# Patient Record
Sex: FEMALE | Race: WHITE | NOT HISPANIC OR LATINO | Employment: OTHER | URBAN - METROPOLITAN AREA
[De-identification: names, ages, dates, MRNs, and addresses within clinical notes are randomized per-mention and may not be internally consistent; named-entity substitution may affect disease eponyms.]

---

## 2023-03-14 LAB
ALANINE AMINOTRANSFERASE (SGPT) (U/L) IN SER/PLAS: 17 U/L (ref 7–45)
ALBUMIN (G/DL) IN SER/PLAS: 4 G/DL (ref 3.4–5)
ALKALINE PHOSPHATASE (U/L) IN SER/PLAS: 86 U/L (ref 33–110)
ANION GAP IN SER/PLAS: 9 MMOL/L (ref 10–20)
ASPARTATE AMINOTRANSFERASE (SGOT) (U/L) IN SER/PLAS: 15 U/L (ref 9–39)
BILIRUBIN TOTAL (MG/DL) IN SER/PLAS: 0.3 MG/DL (ref 0–1.2)
C REACTIVE PROTEIN (MG/L) IN SER/PLAS: 0.61 MG/DL
CALCIUM (MG/DL) IN SER/PLAS: 9.1 MG/DL (ref 8.6–10.3)
CARBON DIOXIDE, TOTAL (MMOL/L) IN SER/PLAS: 35 MMOL/L (ref 21–32)
CERULOPLASMIN (MG/DL) IN SER/PLAS: 28 MG/DL (ref 20–60)
CHLORIDE (MMOL/L) IN SER/PLAS: 100 MMOL/L (ref 98–107)
CREATININE (MG/DL) IN SER/PLAS: 0.74 MG/DL (ref 0.5–1.05)
DEAMIDATED GLIADIN PEPTIDE IGA: <1 U/ML (ref 0–14)
DEAMIDATED GLIADIN PEPTIDE IGG: <1 U/ML (ref 0–14)
FERRITIN (UG/LL) IN SER/PLAS: 20 UG/L (ref 8–150)
GFR FEMALE: >90 ML/MIN/1.73M2
GLUCOSE (MG/DL) IN SER/PLAS: 83 MG/DL (ref 74–99)
HEPATITIS A TOTAL AB INTERPRETATION: NONREACTIVE
HEPATITIS B VIRUS CORE IGM AB PRESENCE IN SER/PLAS BY IMMUNOASSY: NONREACTIVE
HEPATITIS B VIRUS SURFACE AB (MIU/ML) IN SERUM: <3.1 MIU/ML
HEPATITIS C VIRUS AB PRESENCE IN SERUM: NONREACTIVE
IRON (UG/DL) IN SER/PLAS: 57 UG/DL (ref 35–150)
IRON BINDING CAPACITY (UG/DL) IN SER/PLAS: 427 UG/DL (ref 240–445)
IRON SATURATION (%) IN SER/PLAS: 13 % (ref 25–45)
POTASSIUM (MMOL/L) IN SER/PLAS: 3.2 MMOL/L (ref 3.5–5.3)
PROTEIN TOTAL: 6.5 G/DL (ref 6.4–8.2)
SODIUM (MMOL/L) IN SER/PLAS: 141 MMOL/L (ref 136–145)
THYROTROPIN (MIU/L) IN SER/PLAS BY DETECTION LIMIT <= 0.05 MIU/L: 1.54 MIU/L (ref 0.44–3.98)
TISSUE TRANSGLUTAMINASE IGG: <1 U/ML (ref 0–14)
TISSUE TRANSGLUTAMINASE, IGA: <1 U/ML (ref 0–14)
UREA NITROGEN (MG/DL) IN SER/PLAS: 9 MG/DL (ref 6–23)

## 2023-03-15 LAB
ANA PATTERN: ABNORMAL
ANA TITER: ABNORMAL
ANTI-CENTROMERE: <0.2 AI
ANTI-CHROMATIN: <0.2 AI
ANTI-DNA (DS): 1 IU/ML
ANTI-JO-1 IGG: <0.2 AI
ANTI-NUCLEAR ANTIBODY (ANA): POSITIVE
ANTI-RIBOSOMAL P: <0.2 AI
ANTI-RNP: <0.2 AI
ANTI-SCL-70: <0.2 AI
ANTI-SM/RNP: <0.2 AI
ANTI-SM: <0.2 AI
ANTI-SMOOTH MUSCLE ANTIBODY: ABNORMAL
ANTI-SSA: <0.2 AI
ANTI-SSB: <0.2 AI
MITOCHONDRIAL ANTIBODY: NEGATIVE

## 2023-03-18 LAB
ALPHA-1 ANTITRYPSIN PHENOTYPE: NORMAL
ALPHA-1-ANTITRYPSIN (REF): 105 MG/DL (ref 90–200)

## 2023-03-20 LAB
MISCELLANEUOUS TEST RESULT: NORMAL
NAME OF SENDOUT TEST: NORMAL

## 2023-03-22 ENCOUNTER — TELEPHONE (OUTPATIENT)
Dept: PRIMARY CARE | Facility: CLINIC | Age: 58
End: 2023-03-22
Payer: COMMERCIAL

## 2023-03-29 ENCOUNTER — HOSPITAL ENCOUNTER (OUTPATIENT)
Dept: DATA CONVERSION | Facility: HOSPITAL | Age: 58
End: 2023-03-29
Attending: INTERNAL MEDICINE | Admitting: INTERNAL MEDICINE
Payer: COMMERCIAL

## 2023-03-29 DIAGNOSIS — K63.89 OTHER SPECIFIED DISEASES OF INTESTINE: ICD-10-CM

## 2023-03-29 DIAGNOSIS — I47.10 SUPRAVENTRICULAR TACHYCARDIA (CMS-HCC): ICD-10-CM

## 2023-03-29 DIAGNOSIS — Z72.0 TOBACCO USE: ICD-10-CM

## 2023-03-29 DIAGNOSIS — K57.30 DIVERTICULOSIS OF LARGE INTESTINE WITHOUT PERFORATION OR ABSCESS WITHOUT BLEEDING: ICD-10-CM

## 2023-03-29 DIAGNOSIS — F20.9 SCHIZOPHRENIA, UNSPECIFIED (MULTI): ICD-10-CM

## 2023-03-29 DIAGNOSIS — R19.7 DIARRHEA, UNSPECIFIED: ICD-10-CM

## 2023-03-29 DIAGNOSIS — G40.909 EPILEPSY, UNSPECIFIED, NOT INTRACTABLE, WITHOUT STATUS EPILEPTICUS (MULTI): ICD-10-CM

## 2023-03-29 DIAGNOSIS — F41.9 ANXIETY DISORDER, UNSPECIFIED: ICD-10-CM

## 2023-03-29 DIAGNOSIS — F31.9 BIPOLAR DISORDER, UNSPECIFIED (MULTI): ICD-10-CM

## 2023-03-29 DIAGNOSIS — E03.9 HYPOTHYROIDISM, UNSPECIFIED: ICD-10-CM

## 2023-03-29 DIAGNOSIS — D50.0 IRON DEFICIENCY ANEMIA SECONDARY TO BLOOD LOSS (CHRONIC): ICD-10-CM

## 2023-03-29 DIAGNOSIS — K21.9 GASTRO-ESOPHAGEAL REFLUX DISEASE WITHOUT ESOPHAGITIS: ICD-10-CM

## 2023-03-29 DIAGNOSIS — K63.5 POLYP OF COLON: ICD-10-CM

## 2023-03-29 DIAGNOSIS — K52.9 NONINFECTIVE GASTROENTERITIS AND COLITIS, UNSPECIFIED: ICD-10-CM

## 2023-03-29 DIAGNOSIS — F44.81 DISSOCIATIVE IDENTITY DISORDER (MULTI): ICD-10-CM

## 2023-03-29 DIAGNOSIS — K64.0 FIRST DEGREE HEMORRHOIDS: ICD-10-CM

## 2023-03-29 DIAGNOSIS — J43.9 EMPHYSEMA, UNSPECIFIED (MULTI): ICD-10-CM

## 2023-03-29 DIAGNOSIS — E66.9 OBESITY, UNSPECIFIED: ICD-10-CM

## 2023-03-30 LAB — C. DIFFICILE TOXIN, PCR: NOT DETECTED

## 2023-04-05 LAB
COMPLETE PATHOLOGY REPORT: NORMAL
CONVERTED CLINICAL DIAGNOSIS-HISTORY: NORMAL
CONVERTED FINAL DIAGNOSIS: NORMAL
CONVERTED FINAL REPORT PDF LINK TO COPY AND PASTE: NORMAL
CONVERTED GROSS DESCRIPTION: NORMAL
CONVERTED MICROSCOPIC DESCRIPTION: NORMAL

## 2023-04-10 LAB
CAMPYLOBACTER GP: NOT DETECTED
NOROVIRUS GI/GII: NOT DETECTED
ROTAVIRUS A: NOT DETECTED
SALMONELLA SP.: NOT DETECTED
SHIGA TOXIN 1: NOT DETECTED
SHIGA TOXIN 2: NOT DETECTED
SHIGELLA SP.: NOT DETECTED
VIBRIO GRP.: NOT DETECTED
YERSINIA ENTEROCOLITICA: NOT DETECTED

## 2023-04-19 PROBLEM — R43.9 TASTE DISORDER: Status: ACTIVE | Noted: 2023-04-19

## 2023-04-19 PROBLEM — M17.0 OSTEOARTHRITIS OF PATELLOFEMORAL JOINTS OF BOTH KNEES: Status: ACTIVE | Noted: 2023-04-19

## 2023-04-19 PROBLEM — R60.0 PERIPHERAL EDEMA: Status: ACTIVE | Noted: 2023-04-19

## 2023-04-19 PROBLEM — M22.2X2 PATELLOFEMORAL PAIN SYNDROME OF BOTH KNEES: Status: ACTIVE | Noted: 2023-04-19

## 2023-04-19 PROBLEM — R42 DIZZINESS: Status: ACTIVE | Noted: 2023-04-19

## 2023-04-19 PROBLEM — R30.0 DYSURIA: Status: ACTIVE | Noted: 2023-04-19

## 2023-04-19 PROBLEM — M22.2X1 PATELLOFEMORAL PAIN SYNDROME OF BOTH KNEES: Status: ACTIVE | Noted: 2023-04-19

## 2023-04-19 PROBLEM — R10.9 BILATERAL FLANK PAIN: Status: ACTIVE | Noted: 2023-04-19

## 2023-04-19 PROBLEM — R60.0 BILATERAL EDEMA OF LOWER EXTREMITY: Status: ACTIVE | Noted: 2023-04-19

## 2023-04-19 PROBLEM — R07.9 CHEST PAIN: Status: ACTIVE | Noted: 2023-04-19

## 2023-04-19 PROBLEM — K76.0 FATTY LIVER: Status: ACTIVE | Noted: 2023-04-19

## 2023-04-19 PROBLEM — R41.3 POOR MEMORY: Status: ACTIVE | Noted: 2023-04-19

## 2023-04-19 PROBLEM — J44.1 COPD EXACERBATION (MULTI): Status: ACTIVE | Noted: 2023-04-19

## 2023-04-19 PROBLEM — J34.89 MASS OF NASAL SINUS: Status: ACTIVE | Noted: 2023-04-19

## 2023-04-19 PROBLEM — L03.116 CELLULITIS OF FOOT, LEFT: Status: ACTIVE | Noted: 2023-04-19

## 2023-04-19 PROBLEM — H81.01 MENIERE'S DISEASE OF RIGHT EAR: Status: ACTIVE | Noted: 2023-04-19

## 2023-04-19 PROBLEM — I73.9 PERIPHERAL ARTERIAL DISEASE (CMS-HCC): Status: ACTIVE | Noted: 2023-04-19

## 2023-04-19 PROBLEM — R06.09 EXERTIONAL DYSPNEA: Status: ACTIVE | Noted: 2023-04-19

## 2023-04-19 PROBLEM — J34.89 NASAL CONGESTION WITH RHINORRHEA: Status: ACTIVE | Noted: 2023-04-19

## 2023-04-19 PROBLEM — D64.9 ANEMIA: Status: ACTIVE | Noted: 2023-04-19

## 2023-04-19 PROBLEM — E03.9 HYPOTHYROID: Status: ACTIVE | Noted: 2023-04-19

## 2023-04-19 PROBLEM — L74.9 SWEATING ABNORMALITY: Status: ACTIVE | Noted: 2023-04-19

## 2023-04-19 PROBLEM — L02.612 FOOT ABSCESS, LEFT: Status: ACTIVE | Noted: 2023-04-19

## 2023-04-19 PROBLEM — J34.89 NASAL DRYNESS: Status: ACTIVE | Noted: 2023-04-19

## 2023-04-19 PROBLEM — J34.2 DEVIATED NASAL SEPTUM: Status: ACTIVE | Noted: 2023-04-19

## 2023-04-19 PROBLEM — Z86.010 HISTORY OF COLONIC POLYPS: Status: ACTIVE | Noted: 2023-04-19

## 2023-04-19 PROBLEM — K59.09 CHRONIC CONSTIPATION: Status: ACTIVE | Noted: 2023-04-19

## 2023-04-19 PROBLEM — R43.8 DECREASED SENSE OF SMELL: Status: ACTIVE | Noted: 2023-04-19

## 2023-04-19 PROBLEM — M95.0 NASAL DEFORMITY: Status: ACTIVE | Noted: 2023-04-19

## 2023-04-19 PROBLEM — D36.9 PAPILLOMA: Status: ACTIVE | Noted: 2023-04-19

## 2023-04-19 PROBLEM — I87.2 VENOUS INSUFFICIENCY: Status: ACTIVE | Noted: 2023-04-19

## 2023-04-19 PROBLEM — J44.9 COPD (CHRONIC OBSTRUCTIVE PULMONARY DISEASE) (MULTI): Status: ACTIVE | Noted: 2023-04-19

## 2023-04-19 PROBLEM — J34.89 NASAL OBSTRUCTION: Status: ACTIVE | Noted: 2023-04-19

## 2023-04-19 PROBLEM — R09.02 HYPOXIA: Status: ACTIVE | Noted: 2023-04-19

## 2023-04-19 PROBLEM — I47.10 PAROXYSMAL SVT (SUPRAVENTRICULAR TACHYCARDIA) (CMS-HCC): Status: ACTIVE | Noted: 2023-04-19

## 2023-04-19 PROBLEM — D50.9 IDA (IRON DEFICIENCY ANEMIA): Status: ACTIVE | Noted: 2023-04-19

## 2023-04-19 PROBLEM — R00.2 PALPITATIONS: Status: ACTIVE | Noted: 2023-04-19

## 2023-04-19 PROBLEM — R51.9 FACIAL PAIN: Status: ACTIVE | Noted: 2023-04-19

## 2023-04-19 PROBLEM — R60.9 PERIPHERAL EDEMA: Status: ACTIVE | Noted: 2023-04-19

## 2023-04-19 PROBLEM — G43.109 MIGRAINE WITH VISUAL AURA: Status: ACTIVE | Noted: 2023-04-19

## 2023-04-19 PROBLEM — J32.9 SINUSITIS: Status: ACTIVE | Noted: 2023-04-19

## 2023-04-19 PROBLEM — Z86.0100 HISTORY OF COLONIC POLYPS: Status: ACTIVE | Noted: 2023-04-19

## 2023-04-19 PROBLEM — M76.829 POSTERIOR TIBIAL TENDON DYSFUNCTION: Status: ACTIVE | Noted: 2023-04-19

## 2023-04-19 PROBLEM — R11.2 NAUSEA WITH VOMITING: Status: ACTIVE | Noted: 2023-04-19

## 2023-04-19 PROBLEM — F31.9 BIPOLAR DISORDER (MULTI): Status: ACTIVE | Noted: 2023-04-19

## 2023-04-19 PROBLEM — K58.9 IRRITABLE BOWEL SYNDROME: Status: ACTIVE | Noted: 2023-04-19

## 2023-04-19 PROBLEM — F25.9 SCHIZOAFFECTIVE DISORDER (MULTI): Status: ACTIVE | Noted: 2023-04-19

## 2023-04-19 PROBLEM — M25.562 BILATERAL KNEE PAIN: Status: ACTIVE | Noted: 2023-04-19

## 2023-04-19 PROBLEM — M25.50 ARTHRALGIA: Status: ACTIVE | Noted: 2023-04-19

## 2023-04-19 PROBLEM — R19.5 DARK STOOLS: Status: ACTIVE | Noted: 2023-04-19

## 2023-04-19 PROBLEM — M25.561 RIGHT KNEE PAIN: Status: ACTIVE | Noted: 2023-04-19

## 2023-04-19 PROBLEM — M25.561 BILATERAL KNEE PAIN: Status: ACTIVE | Noted: 2023-04-19

## 2023-04-19 PROBLEM — H90.3 ASYMMETRICAL SENSORINEURAL HEARING LOSS: Status: ACTIVE | Noted: 2023-04-19

## 2023-04-19 PROBLEM — R61 SWEATING INCREASE: Status: ACTIVE | Noted: 2023-04-19

## 2023-04-19 PROBLEM — M19.90 OSTEOARTHRITIS: Status: ACTIVE | Noted: 2023-04-19

## 2023-04-19 PROBLEM — M22.41 CHONDROMALACIA OF RIGHT PATELLA: Status: ACTIVE | Noted: 2023-04-19

## 2023-04-19 PROBLEM — E78.5 HYPERLIPIDEMIA: Status: ACTIVE | Noted: 2023-04-19

## 2023-04-19 PROBLEM — J34.89 CONCHA BULLOSA: Status: ACTIVE | Noted: 2023-04-19

## 2023-04-19 PROBLEM — K21.9 GASTROESOPHAGEAL REFLUX DISEASE WITHOUT ESOPHAGITIS: Status: ACTIVE | Noted: 2023-04-19

## 2023-04-19 PROBLEM — M20.12 HALLUX VALGUS, LEFT: Status: ACTIVE | Noted: 2023-04-19

## 2023-04-19 PROBLEM — R07.81 RIB PAIN ON LEFT SIDE: Status: ACTIVE | Noted: 2023-04-19

## 2023-04-19 PROBLEM — R44.8 FACIAL PRESSURE: Status: ACTIVE | Noted: 2023-04-19

## 2023-04-19 PROBLEM — M79.673 FOOT PAIN: Status: ACTIVE | Noted: 2023-04-19

## 2023-04-19 PROBLEM — R49.0 HOARSENESS: Status: ACTIVE | Noted: 2023-04-19

## 2023-04-19 PROBLEM — L81.1 MELASMA: Status: ACTIVE | Noted: 2023-04-19

## 2023-04-19 PROBLEM — R06.00 DYSPNEA: Status: ACTIVE | Noted: 2023-04-19

## 2023-04-19 PROBLEM — M20.11 ACQUIRED HALLUX VALGUS OF BOTH FEET: Status: ACTIVE | Noted: 2023-04-19

## 2023-04-19 PROBLEM — B37.0 OROPHARYNGEAL CANDIDIASIS: Status: ACTIVE | Noted: 2023-04-19

## 2023-04-19 PROBLEM — M79.89 LEG SWELLING: Status: ACTIVE | Noted: 2023-04-19

## 2023-04-19 PROBLEM — R09.81 NASAL CONGESTION WITH RHINORRHEA: Status: ACTIVE | Noted: 2023-04-19

## 2023-04-19 PROBLEM — D14.0 INVERTED PAPILLOMA OF NASAL CAVITY: Status: ACTIVE | Noted: 2023-04-19

## 2023-04-19 PROBLEM — M21.40 PES PLANUS: Status: ACTIVE | Noted: 2023-04-19

## 2023-04-19 PROBLEM — K21.9 LARYNGOPHARYNGEAL REFLUX (LPR): Status: ACTIVE | Noted: 2023-04-19

## 2023-04-19 PROBLEM — M20.12 ACQUIRED HALLUX VALGUS OF BOTH FEET: Status: ACTIVE | Noted: 2023-04-19

## 2023-04-25 DIAGNOSIS — E03.8 OTHER SPECIFIED HYPOTHYROIDISM: Primary | ICD-10-CM

## 2023-04-25 RX ORDER — LEVOTHYROXINE SODIUM 50 UG/1
50 TABLET ORAL DAILY
COMMUNITY
End: 2023-04-25 | Stop reason: SDUPTHER

## 2023-04-25 RX ORDER — LEVOTHYROXINE SODIUM 50 UG/1
50 TABLET ORAL DAILY
Qty: 30 TABLET | Refills: 2 | Status: SHIPPED | OUTPATIENT
Start: 2023-04-25

## 2023-07-03 DIAGNOSIS — E78.2 MIXED HYPERLIPIDEMIA: Primary | ICD-10-CM

## 2023-07-03 RX ORDER — SIMVASTATIN 20 MG/1
20 TABLET, FILM COATED ORAL DAILY
COMMUNITY
End: 2023-07-03 | Stop reason: SDUPTHER

## 2023-07-03 RX ORDER — SIMVASTATIN 20 MG/1
20 TABLET, FILM COATED ORAL DAILY
Qty: 90 TABLET | Refills: 1 | Status: SHIPPED | OUTPATIENT
Start: 2023-07-03 | End: 2024-02-01 | Stop reason: SDUPTHER

## 2023-07-10 LAB
BASOPHILS (10*3/UL) IN BLOOD BY AUTOMATED COUNT: 0.03 X10E9/L (ref 0–0.1)
BASOPHILS/100 LEUKOCYTES IN BLOOD BY AUTOMATED COUNT: 0.4 % (ref 0–2)
ERYTHROCYTE DISTRIBUTION WIDTH (RATIO) BY AUTOMATED COUNT: 13.9 % (ref 11.5–14.5)
ERYTHROCYTE MEAN CORPUSCULAR HEMOGLOBIN CONCENTRATION (G/DL) BY AUTOMATED: 31.6 G/DL (ref 32–36)
ERYTHROCYTE MEAN CORPUSCULAR VOLUME (FL) BY AUTOMATED COUNT: 89 FL (ref 80–100)
ERYTHROCYTES (10*6/UL) IN BLOOD BY AUTOMATED COUNT: 4.9 X10E12/L (ref 4–5.2)
FERRITIN (UG/LL) IN SER/PLAS: 35 UG/L (ref 8–150)
HEMATOCRIT (%) IN BLOOD BY AUTOMATED COUNT: 43.4 % (ref 36–46)
HEMOGLOBIN (G/DL) IN BLOOD: 13.7 G/DL (ref 12–16)
IMMATURE GRANULOCYTES/100 LEUKOCYTES IN BLOOD BY AUTOMATED COUNT: 0.1 % (ref 0–0.9)
IRON (UG/DL) IN SER/PLAS: 56 UG/DL (ref 35–150)
IRON BINDING CAPACITY (UG/DL) IN SER/PLAS: 446 UG/DL (ref 240–445)
IRON SATURATION (%) IN SER/PLAS: 13 % (ref 25–45)
LEUKOCYTES (10*3/UL) IN BLOOD BY AUTOMATED COUNT: 7.6 X10E9/L (ref 4.4–11.3)
LYMPHOCYTES (10*3/UL) IN BLOOD BY AUTOMATED COUNT: 1.34 X10E9/L (ref 1.2–4.8)
LYMPHOCYTES/100 LEUKOCYTES IN BLOOD BY AUTOMATED COUNT: 17.7 % (ref 13–44)
MONOCYTES (10*3/UL) IN BLOOD BY AUTOMATED COUNT: 0.53 X10E9/L (ref 0.1–1)
MONOCYTES/100 LEUKOCYTES IN BLOOD BY AUTOMATED COUNT: 7 % (ref 2–10)
NEUTROPHILS (10*3/UL) IN BLOOD BY AUTOMATED COUNT: 5.68 X10E9/L (ref 1.2–7.7)
NEUTROPHILS/100 LEUKOCYTES IN BLOOD BY AUTOMATED COUNT: 74.8 % (ref 40–80)
PLATELETS (10*3/UL) IN BLOOD AUTOMATED COUNT: 398 X10E9/L (ref 150–450)

## 2023-07-24 DIAGNOSIS — K21.9 GASTROESOPHAGEAL REFLUX DISEASE WITHOUT ESOPHAGITIS: Primary | ICD-10-CM

## 2023-07-24 RX ORDER — DEXLANSOPRAZOLE 60 MG/1
1 CAPSULE, DELAYED RELEASE ORAL DAILY
COMMUNITY
End: 2023-07-24 | Stop reason: SDUPTHER

## 2023-07-25 RX ORDER — DEXLANSOPRAZOLE 60 MG/1
1 CAPSULE, DELAYED RELEASE ORAL DAILY
Qty: 30 CAPSULE | Refills: 3 | Status: SHIPPED | OUTPATIENT
Start: 2023-07-25 | End: 2023-12-07 | Stop reason: SDUPTHER

## 2023-09-07 VITALS — BODY MASS INDEX: 30.79 KG/M2 | HEIGHT: 64 IN | WEIGHT: 180.34 LBS

## 2023-12-07 DIAGNOSIS — K21.9 GASTROESOPHAGEAL REFLUX DISEASE WITHOUT ESOPHAGITIS: ICD-10-CM

## 2023-12-07 RX ORDER — DEXLANSOPRAZOLE 60 MG/1
1 CAPSULE, DELAYED RELEASE ORAL DAILY
Qty: 30 CAPSULE | Refills: 3 | Status: SHIPPED | OUTPATIENT
Start: 2023-12-07 | End: 2024-04-05 | Stop reason: SDUPTHER

## 2023-12-27 ENCOUNTER — OFFICE VISIT (OUTPATIENT)
Dept: OTOLARYNGOLOGY | Facility: CLINIC | Age: 58
End: 2023-12-27
Payer: COMMERCIAL

## 2023-12-27 VITALS — BODY MASS INDEX: 28.51 KG/M2 | WEIGHT: 167 LBS | HEIGHT: 64 IN

## 2023-12-27 DIAGNOSIS — J31.0 CHRONIC RHINITIS: ICD-10-CM

## 2023-12-27 DIAGNOSIS — H90.3 SENSORINEURAL HEARING LOSS (SNHL) OF BOTH EARS: ICD-10-CM

## 2023-12-27 DIAGNOSIS — G50.1 ATYPICAL FACE PAIN: ICD-10-CM

## 2023-12-27 DIAGNOSIS — R44.8 FACIAL PRESSURE: ICD-10-CM

## 2023-12-27 DIAGNOSIS — J34.89 RHINORRHEA: Primary | ICD-10-CM

## 2023-12-27 DIAGNOSIS — H93.13 TINNITUS OF BOTH EARS: ICD-10-CM

## 2023-12-27 PROCEDURE — 31231 NASAL ENDOSCOPY DX: CPT | Performed by: OTOLARYNGOLOGY

## 2023-12-27 PROCEDURE — 3008F BODY MASS INDEX DOCD: CPT | Performed by: OTOLARYNGOLOGY

## 2023-12-27 PROCEDURE — 99214 OFFICE O/P EST MOD 30 MIN: CPT | Performed by: OTOLARYNGOLOGY

## 2023-12-27 RX ORDER — AZELASTINE 1 MG/ML
2 SPRAY, METERED NASAL 2 TIMES DAILY
Qty: 30 ML | Refills: 11 | Status: CANCELLED
Start: 2023-12-27 | End: 2024-12-26

## 2023-12-27 RX ORDER — TRIHEXYPHENIDYL HYDROCHLORIDE 5 MG/1
TABLET ORAL
COMMUNITY
Start: 2017-09-18

## 2023-12-27 RX ORDER — PRAZOSIN HYDROCHLORIDE 5 MG/1
CAPSULE ORAL
COMMUNITY
Start: 2019-03-08

## 2023-12-27 RX ORDER — UBIDECARENONE 75 MG
1 CAPSULE ORAL DAILY
COMMUNITY

## 2023-12-27 RX ORDER — ALPRAZOLAM 1 MG/1
1 TABLET ORAL 2 TIMES DAILY
COMMUNITY

## 2023-12-27 RX ORDER — ALBUTEROL SULFATE 0.63 MG/3ML
3 SOLUTION RESPIRATORY (INHALATION)
COMMUNITY
Start: 2021-11-22

## 2023-12-27 RX ORDER — ROFLUMILAST 500 UG/1
500 TABLET ORAL DAILY
COMMUNITY
End: 2024-03-11 | Stop reason: SDUPTHER

## 2023-12-27 ASSESSMENT — PAIN SCALES - GENERAL: PAINLEVEL: 0-NO PAIN

## 2023-12-27 ASSESSMENT — PATIENT HEALTH QUESTIONNAIRE - PHQ9
2. FEELING DOWN, DEPRESSED OR HOPELESS: NOT AT ALL
SUM OF ALL RESPONSES TO PHQ9 QUESTIONS 1 & 2: 0
1. LITTLE INTEREST OR PLEASURE IN DOING THINGS: NOT AT ALL

## 2023-12-27 NOTE — PROGRESS NOTES
Chief Complaint:  1. 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 Rhinorrhea  3. Nasal airway obstruction  4. Facial pain and pressure  5. Decreased sense of smell  6. History of significant right-sided facial trauma    History Of Present Illness:    Main Symptoms:  Patient has anterior nasal drainage.     Patient has  posterior nasal drainage.    Patient does not have nasal airway obstruction.   Patient has  facial pain.    Patient has  facial pressure.    Patient does not have decreased sense of smell.   Associated Symptoms:   Patient has  headaches.  history of migraine.  Patient has throat clearing.  Current smoker  Patient has coughing.  Current smoker  Patient has dysphonia. Current smoker  Patient does not have nasal bleeding.     Medications currently on for sinonasal symptoms:   None.   Medications tried in the past for sinonasal symptoms:  Flonase (caused nasal bleeding).    Other Pertinent Medical Conditions:   Patient has migraines.    Patient does not have history of allergy testing.    Karolina Vogt presents for follow-up after last being seen 11/10/21.     She presents for routine follow-up.  She mentioned that following her surgery with Dr. Woo she will have shooting pains in her right nostril that extend upward into her brain.  This typically happens when she inhales cold air.  Although she improves in the summer, if she is exposed to air conditioning she will have the same issue.  This can happen several times per day.  She has tried covering her nose and this does not provide significant benefit.  She has a history of migraines and takes medical therapy for this.    She also been having persistent bilateral ear ringing worse on the right than the left.  She also describes sounds in her ears like crickets.  This started about a year ago.  She denies vertigo, ear pain, ear drainage.  She denies any acute changes to her hearing.    Active Problems:  Patient Active  Problem List   Diagnosis    Acquired hallux valgus of both feet    Anemia    Arthralgia    Asymmetrical sensorineural hearing loss    Bilateral edema of lower extremity    Bilateral knee pain    Bipolar disorder (CMS/HCC)    Cellulitis of foot, left    Bilateral flank pain    Chest pain    Chondromalacia of right patella    Chronic constipation    Irritable bowel syndrome    COPD (chronic obstructive pulmonary disease) (CMS/HCC)    COPD exacerbation (CMS/HCC)    Dark stools    Decreased sense of smell    Deviated nasal septum    Dizziness    Dyspnea    Exertional dyspnea    Dysuria    Facial pain    Facial pressure    Fatty liver    Foot abscess, left    Foot pain    Gastroesophageal reflux disease without esophagitis    Hallux valgus, left    History of colonic polyps    Hoarseness    Hyperlipidemia    Hypothyroid    Hypoxia    KELLEE (iron deficiency anemia)    Inverted papilloma of nasal cavity    Laryngopharyngeal reflux (LPR)    Leg swelling    Mass of nasal sinus    Melasma    Meniere's disease of right ear    Migraine with visual aura    Nasal congestion with rhinorrhea    Nasal deformity    Nasal dryness    Nikole bullosa    Nasal obstruction    Nausea with vomiting    Oropharyngeal candidiasis    Osteoarthritis    Osteoarthritis of patellofemoral joints of both knees    Patellofemoral pain syndrome of both knees    Palpitations    Papilloma    Paroxysmal SVT (supraventricular tachycardia)    Peripheral arterial disease (CMS/HCC)    Peripheral edema    Pes planus    Poor memory    Posterior tibial tendon dysfunction    Rib pain on left side    Right knee pain    Schizoaffective disorder (CMS/HCC)    Sinusitis    Sweating abnormality    Sweating increase    Taste disorder    Venous insufficiency      Past Medical History:  She has a past medical history of Chronic obstructive pulmonary disease with (acute) exacerbation (CMS/HCC) (10/12/2020), Chronic obstructive pulmonary disease with (acute) exacerbation  (CMS/[facility]) (10/12/2020), Chronic obstructive pulmonary disease with (acute) exacerbation (CMS/[facility]) (10/12/2020), Other specified postprocedural states, Personal history of other endocrine, nutritional and metabolic disease, and Personal history of other specified conditions.    Surgical History:  She has a past surgical history that includes Foot surgery (10/20/2014) and Knee arthroscopy w/ debridement (10/20/2014).     Family History:  No family history on file.    Social History:  She has no history on file for tobacco use, alcohol use, and drug use.     Allergies:  Patient has no allergy information on record.    Current Meds:  Current Outpatient Medications:     dexlansoprazole (Dexilant) 60 mg DR capsule, Take 1 capsule (60 mg) by mouth once daily., Disp: 30 capsule, Rfl: 3    levothyroxine (Synthroid, Levoxyl) 50 mcg tablet, Take 1 tablet (50 mcg) by mouth once daily., Disp: 30 tablet, Rfl: 2    simvastatin (Zocor) 20 mg tablet, Take 1 tablet (20 mg) by mouth once daily., Disp: 90 tablet, Rfl: 1    Vitals:  There were no vitals taken for this visit.     Physical Exam:  Ears:  Examination of the right ear revealed cerumen within the canal.  Examination of the left ear revealed a normal pinnae, external auditory canal, and tympanic membrane.    Nose: On external exam there are neither lesions nor asymmetry of the nasal tip/dorsum. On anterior rhinoscopy, visualization posteriorly is limited on anterior examination. For this reason, to adequately evaluate posteriorly for masses, source of epistaxis, polypoid disease, debridement, and/or signs of infections, nasal endoscopy is indicated.  (Please see procedure below.)    SINONASAL ENDOSCOPY (CPT 47677): To better evaluate the patient's symptoms, sinonasal endoscopy is indicated.  After discussion of risks and benefits, and topical decongestion and anesthesia,an endoscope was used to perform nasal endoscopy on each side.  A time out identifying the patient, the  procedure, the location of the procedure and any concerns was performed prior to beginning the procedure.    Findings:  Examination of the right nasal cavity revealed clear mucus draining from the middle meatus.  Sphenoethmoid recess and nasopharynx were normal.  Examination of the left nasal cavity revealed a normal middle meatus and sphenoethmoid recess.  Normal nasopharynx.  She is s/p resection of a left larry bullosa.  There was some fullness posteriorly along the middle turbinate more consistent with surgical changes and papilloma.    Results/Data:  I personally reviewed both Dr. Woo's operative report from 12/15/20 and his clinic note from 3/19/21 in preparation for this visit.     I personally reviewed Dr. Hurt's clinic note from 7/7/21 in preparation for this visit.     Provider Impressions:  1. 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 Rhinorrhea  3. Shooting pain right side of her face  4. Headaches, history of migraines   5. Throat clearing, coughing, dysphonia   6. History of significant right-sided facial trauma  7. Bilateral tinnitus     Discussion:  Karolina Vogt and I discussed her concerns today.  In regard to her tinnitus, I recommended evaluation with Jina Key CNP, for workup along with a same-day audiogram.  This was ordered today and the evaluation was coordinated.    In regard to her nasal symptoms, we specifically discussed her shooting pain on the right side of her face.  This sounds more neurologic than related to her sinuses.  I think it is more than reasonable to consider medical therapy for her sinonasal symptoms but if this persists she may benefit from discussing this with neurology.    I recommended that she initiate Azelastine.  I asked her to use this over the next 1 month consistently and if she is having benefit continue it and if not discontinue it.  At 1 month, I recommended that she initiate ipratropium.  I asked her to use the  same trial and if she derives benefit over 1 month continue it and if not discontinue it.  Finally I have asked her to use Nasacort.  She can use all 3 sprays together if she derives benefit from each.  We discussed appropriate administration technique and an information sheet was provided.    In regard to the left sided sinonasal papilloma, I recommended continue observation as I do not see anything specifically concerning today.  We discussed the fullness along the posterior aspect of her left middle turbinate and I suspect this is postsurgical.  Her surgery was 3 years ago so I recommended continued surveillance and follow-up with me in 6 months.  She was amenable to this and all questions were answered.    Between face-to-face contact, documentation, and review of the medical record I spent greater than 30 minutes on this visit today.    Patient Discussion/Summary:  Please followup with me in 6 months for reevaluation or sooner with any questions or concerns. Please feel free to contact my office by calling [phone] with any questions.    Signature:  Scribe Attestation  By signing my name below, I, Asia Eaton   attest that this documentation has been prepared under the direction and in the presence of Ramesh Tilley MD.

## 2024-01-23 ENCOUNTER — CLINICAL SUPPORT (OUTPATIENT)
Dept: AUDIOLOGY | Facility: CLINIC | Age: 59
End: 2024-01-23
Payer: COMMERCIAL

## 2024-01-23 ENCOUNTER — OFFICE VISIT (OUTPATIENT)
Dept: OTOLARYNGOLOGY | Facility: CLINIC | Age: 59
End: 2024-01-23
Payer: COMMERCIAL

## 2024-01-23 VITALS
DIASTOLIC BLOOD PRESSURE: 71 MMHG | HEIGHT: 64 IN | WEIGHT: 173 LBS | BODY MASS INDEX: 29.53 KG/M2 | SYSTOLIC BLOOD PRESSURE: 108 MMHG

## 2024-01-23 DIAGNOSIS — M26.69: ICD-10-CM

## 2024-01-23 DIAGNOSIS — M26.609 TMJ DYSFUNCTION: ICD-10-CM

## 2024-01-23 DIAGNOSIS — H93.13 TINNITUS OF BOTH EARS: ICD-10-CM

## 2024-01-23 DIAGNOSIS — H61.23 BILATERAL IMPACTED CERUMEN: ICD-10-CM

## 2024-01-23 DIAGNOSIS — H90.3 BILATERAL HIGH FREQUENCY SENSORINEURAL HEARING LOSS: ICD-10-CM

## 2024-01-23 DIAGNOSIS — H93.13 TINNITUS OF BOTH EARS: Primary | ICD-10-CM

## 2024-01-23 DIAGNOSIS — H90.3 SENSORINEURAL HEARING LOSS (SNHL) OF BOTH EARS: ICD-10-CM

## 2024-01-23 PROCEDURE — 99203 OFFICE O/P NEW LOW 30 MIN: CPT

## 2024-01-23 PROCEDURE — 92550 TYMPANOMETRY & REFLEX THRESH: CPT | Performed by: AUDIOLOGIST

## 2024-01-23 PROCEDURE — 69210 REMOVE IMPACTED EAR WAX UNI: CPT

## 2024-01-23 PROCEDURE — 3008F BODY MASS INDEX DOCD: CPT

## 2024-01-23 PROCEDURE — 92557 COMPREHENSIVE HEARING TEST: CPT | Performed by: AUDIOLOGIST

## 2024-01-23 ASSESSMENT — ENCOUNTER SYMPTOMS: OCCASIONAL FEELINGS OF UNSTEADINESS: 0

## 2024-01-23 ASSESSMENT — PAIN SCALES - GENERAL: PAINLEVEL_OUTOF10: 0 - NO PAIN

## 2024-01-23 ASSESSMENT — PAIN - FUNCTIONAL ASSESSMENT: PAIN_FUNCTIONAL_ASSESSMENT: 0-10

## 2024-01-23 NOTE — PROGRESS NOTES
Patient ID: Karolina Vogt is a 59 y.o. female who presents for the evaluation of tinnitus. They present as a referral from Dr. Ramesh Tilley. Patient is accompanied to the visit today by her roommate, Ying.    PROVIDER IMPRESSIONS:  DIAGNOSES/PROBLEMS:  -Tinnitus, bilateral  -High-frequency sensorineural hearing loss, bilateral  -TMJ dysfunction  -TMJ crepitus on jaw opening (right>left)  -Cerumen impaction    ASSESSMENT:   Karolina Vogt is a pleasant 59 y.o. female who presents with symptoms of bilateral, continuous, non-pulsatile tinnitus with greater volume intensity in the right ear. Based on the clinical information provided, symptoms and clinical exam findings are consistent with bilateral sensorineural hearing loss vs. TMJ dysfunction. Exam today revealed bilateral cerumen impaction (right>left occlusion) and TMJ crepitus with jaw opening that was more prominent on the right.  Using appropriate instrumentation, the impacted cerumen was successfully removed from bilateral EACs. Reassurance provided to patient that otologic exam today revealed no evidence of acute infection/inflammation in the external auditory canal (EAC) bilaterally and that tympanic membrane (TM) appears with no evidence of infection, effusion, retraction or perforation bilaterally.  Audiogram reviewed in detail with the patient, which revealed mild to moderate bilateral high-frequency SNHL. The etiology of temporomandibular joint disorders (TMD) was discussed in detail with patient including: structural issues such as alterations in dental occlusion (the alignment of the upper and lower teeth) that affect maxillomandibular position and function. These issues may or may not be associated with joint trauma, poor posture or cervicogenic etiologies. Possible psychologic factors include anxiety, depression and PTSD. Multiple other contributing and comorbid factors, including biological, behavioral, environmental, and cognitive disorders  which can all contribute to the development of symptoms were also reviewed with the patient. I explained that her reported history of continual jaw-clenching/bruxism, neck tightness/tension, and right-sided facial trauma with surgical cheekbone replacement may all correlate to TMJ dysfunction that can exacerbate tinnitus symptoms.    PLAN:  I recommended the following strategies for TMJ symptom management:   Soft diet: for the next 2 weeks, please avoid eating chewy or tough foods such as hamburgers, hot dogs, pizza, steak, meats, raw fruits and vegetables, or anything else that requires significant mastication. Examples of appropriate soft foods include mashed potatoes, soft pasta, macaroni, yogurt, ice cream, and other things that could easily be mashed with a fork.  Temple and cheekbone massages: using your knuckles, gently massage in circles near temples and along your cheekbones as tolerated.  Warm or cold compresses: apply along the entire side of the affected face for no more than 20 minutes at a time, remove sooner if skin irritation occurs.   Mouth guard: consider purchase of a mouth guard to prevent jaw clenching and/or teeth grinding during sleep.  REFERRAL TMJ PHYSICAL THERAPY: I recommended referral to TMJ physical therapy for evaluation and management, given the severity of TMD symptoms/findings. Internal referral e-submitted and patient provided a paper copy of requisition with instruction to call and schedule with  PT services.   I counseled patient on other treatments for persistent, bothersome tinnitus may be considered as optional recommendations given the lack of supportive evidence. These include masking techniques and sound cover-up strategies for tinnitus of primary etiology. I advised that patient limits excessive caffeine and/or alcohol consumption to determine if factors elicit tinnitus exacerbation. We discussed that her hearing function is essentially normal bilaterally in frequencies  under 3000 Hz, and thus hearing aid devices are not indicated at this time and may not sufficiently improve tinnitus from a primary etiology.   Follow-up: Patient may schedule for follow-up in 6 months to evaluate treatment outcomes for tinnitus. They may follow-up sooner, if needed. Patient is agreeable to this plan, all questions were answered to patient's satisfaction.     Subjective   HPI: Karolina Vogt is a 59 y.o. female who presents for the evaluation of tinnitus.  The patient states that symptom onset began a few years ago, but has gradually progressed over the past 1 year. She describes tinnitus as bilateral, continuous, non-pulsatile sound of crickets/hissing in both ears, mentions that tinnitus is significantly worse in the right ear but present in both ears, and does not cause nighttime wakening. When asked about the presence of hearing loss, ear pain, ear fullness/pressure,  ear itching, ear drainage, autophony, dizziness or vertigo, she admits to none. When asked about pertinent otologic history, the patient denies a history of recurrent ear infections, denies history of ear surgery, denies history of PE tube insertion, and reports history of prolonged/traumatic loud noise exposure. The patient does not insert Q-tips in the ear canals, and  denies insertion of other foreign objects into the ear canals. The patient denies history of wearing hearing aid devices. The patient does not endorse a family history of hearing loss. 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 Reports continuous/daily jaw-clenching and teeth grinding for many years. Patient was seen by Dr. Tilley on 12/27/2023 with findings of left sinonasal papilloma and advised to use Azelastine, Ipratroprium Bromide, and Nasacort for sinonasal symptoms. Other pertinent past medical history  reported includes history of migraines, history of fall with right-sided mandible/orbital fracture (15 yrs ago), paranoid  schizophrenia (managed with psychiatry), and COPD. She denies history of allergy/immunology testing. Surgical history includes resection of left larry bullosa with Dr. Hang Woo in December 2020. She is a current smoker.    PATIENT HISTORY:  Past Medical History:   Diagnosis Date    Chronic obstructive pulmonary disease with (acute) exacerbation (CMS/HCC) 10/12/2020    COPD exacerbation    Chronic obstructive pulmonary disease with (acute) exacerbation (CMS/HCC) 10/12/2020    COPD exacerbation    Chronic obstructive pulmonary disease with (acute) exacerbation (CMS/HCC) 10/12/2020    COPD exacerbation    Other specified postprocedural states     H/O ovarian cystectomy    Personal history of other endocrine, nutritional and metabolic disease     History of alpha-1-antitrypsin deficiency    Personal history of other specified conditions     History of dyspnea      Past Surgical History:   Procedure Laterality Date    FOOT SURGERY  10/20/2014    Foot Surgery    KNEE ARTHROSCOPY W/ DEBRIDEMENT  10/20/2014    Knee Arthroscopy (Therapeutic)      Allergies   Allergen Reactions    Nsaids (Non-Steroidal Anti-Inflammatory Drug) Nausea/vomiting    Bupropion Seizure     seizures with wellbutrin    Caffeine Unknown     vomitting    Cephalosporins Nausea Only and Nausea/vomiting    Diphenhydramine Seizure    Erythromycin Nausea Only    Gabapentin Unknown    Pramipexole Unknown    Tramadol Nausea Only and Other     PT REPORTS DOES NOT MIXX WELL WITH PSYCH MEDS    Trazodone Unknown     night terrors    Azithromycin Nausea Only and Rash        Current Outpatient Medications:     albuterol 0.63 mg/3 mL nebulizer solution, Inhale 3 mL every 4 hours., Disp: , Rfl:     ALPRAZolam (Xanax) 1 mg tablet, Take 1 tablet (1 mg) by mouth 2 times a day., Disp: , Rfl:     cyanocobalamin (Vitamin B-12) 500 mcg tablet, Take 1 tablet (500 mcg) by mouth once daily., Disp: , Rfl:     dexlansoprazole (Dexilant) 60 mg DR capsule, Take 1 capsule (60  mg) by mouth once daily., Disp: 30 capsule, Rfl: 3    levothyroxine (Synthroid, Levoxyl) 50 mcg tablet, Take 1 tablet (50 mcg) by mouth once daily., Disp: 30 tablet, Rfl: 2    prazosin (Minipress) 5 mg capsule, , Disp: , Rfl:     roflumilast (Daliresp) 500 mcg tablet, Take 1 tablet (500 mcg) by mouth once daily., Disp: , Rfl:     simvastatin (Zocor) 20 mg tablet, Take 1 tablet (20 mg) by mouth once daily., Disp: 90 tablet, Rfl: 1    trihexyphenidyl (Artane) 5 mg tablet, Take by mouth., Disp: , Rfl:    Tobacco Use: High Risk (12/27/2023)    Patient History     Smoking Tobacco Use: Every Day     Smokeless Tobacco Use: Never     Passive Exposure: Not on file      Alcohol Use: Not on file      Social History     Substance and Sexual Activity   Drug Use Not Currently    Types: Marijuana        Review of Systems   All other systems negative.     Objective   Visit Vitals  Smoking Status Every Day        PHYSICAL EXAM:  General appearance: Appears well, well-nourished, well groomed. No acute distress.   Constitutional: No fever, chills, weight loss or weight gain.  Communication: Normal communication  Psychiatric: Oriented to person, place and time. Normal mood and affect.  Neurologic: Cranial nerves II-XII grossly intact and symmetric bilaterally.  Cardiovascular: Examination of peripheral vascular system shows no clubbing or cyanosis.  Respiratory: No respiratory distress increased work of breathing. Inspection of the chest with symmetric chest expansion and normal respiratory effort.  Skin: No head and neck rashes.  Head: Normocephalic. Atraumatic with no masses, lesions or scarring.  Face: Normal symmetry. No scars or deformities.  Eyes: Conjunctiva not edematous or erythematous. PERRLA  Neck: Supple and symmetric, trachea midline. Lymph nodes with no adenopathy.  Head: Normocephalic. Atraumatic with no masses, lesions or scarring.  Eyes: PERRL, EOMI, Conjunctiva is clear. No nystagmus.  Nose: External inspection of  nose: No nasal lesions, lacerations or scars. Mild/moderate tenderness with palpation of right maxillary sinus near right orbit. No tenderness on frontal or left maxillary sinus palpation.  Throat:  Floor of mouth is clear, no masses.  Tongue appears normal, no lesions or masses. Gums, gingiva, buccal mucosa appear pink and moist, no lesions. Teeth are in intact.  No obvious dental infections.  Peritonsillar regions appear symmetric without swelling.  Hard and soft palate appear normal, no obvious cleft. Uvula is midline.  Oropharynx: No lesions. Retropharyngeal wall is flat.  []postnasal drip.  Salivary Glands: Symmetric bilaterally.  No palpable masses.  No evidence of acute infection or salivary stones.  TMJ: Moderate TMJ crepitus (left>right) on jaw opening/lateral movement on bimanual palpation, no trismus.  Right Ear: External inspection of ear with no deformity, scars, or masses. Mastoid is nontender. External auditory canal is partially impacted with cerumen. Unable to visualize TM.  Left Ear: External inspection of ear with no deformity, scars, or masses. Mastoid is nontender. External auditory canal is partially impacted with cerumen. Unable to visualize TM.    RESULTS:  I personally reviewed the patient's audiogram today from 1/23/2024, which showed: Normal hearing across all frequencies through 3000 Hz, sloping to mild to moderate sensorineural hearing loss above in bilateral ears. Normal tympanogram bilaterally. Excellent word discrimination bilaterally. Acoustic reflexes present right ipsilateral, and left ipsilateral.    Procedures   EAR CLEANING PROCEDURE NOTE:  Indication: Cerumen impaction  Location: bilateral ear canals  Procedure Note: The procedure was performed by the provider.  Visualization Instrument: A microscope with a #5 speculum was placed in the ear canals to visualize the ear canal debris.  Ear Cleaning Instrument and Outcome: Using the 7 suction, a large amount of soft, yellow cerumen  was removed from the impacted EAC(s).  Post-Procedure/Microscopic Otologic Exam:  Right Ear--TM is intact with no sign of infection, effusion, or retraction.  No perforation seen. EAC is clear. Auto insufflation visible under microscopy.  Left Ear-- TM is intact with no sign of infection, effusion, or retraction.  No perforation seen. EAC is clear. Auto insufflation visible under microscopy.  Patient Status: The patient tolerated the procedure well.  Complications: There were no complications.     Magy Key, APRN-CNP

## 2024-01-23 NOTE — PROGRESS NOTES
AUDIOLOGY ADULT AUDIOMETRIC EVALUATION      Name:  Karolina Vogt  :  1965  Age:  59 y.o.  Date of Evaluation: 24    History:  Reason for visit:  Ms. Karolina Vogt was seen today as part of the visit with ERICA Ayers for an evaluation of hearing.   Chief Complaint   Patient presents with    Tinnitus     Reported for approximately the past year she has noticed a bilateral non-pulsatile ringing tinnitus in each ear. Stated the tinnitus appears to be louder in the right ear compared to the left ear. The tinnitus has been perceived as a constant sound of crickets and ringing.   Stated the tinnitus at times may interfere with her sleep and communication at times. She follows with a mental health care professional and stated her medications assist in sleeping.   Mentioned over fifteen years ago she fell and hit the right side of her face, fracturing her right orbital and mandible.   Does not have any hearing concerns at this time and reported she has been able to hear and communicate easily. Previous hearing testing performed on 19 indicated a mild hearing loss in the right ear and a mild to moderate loss in the left ear. Fair to poor reliability was noted.  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   Denied any current otalgia, aural fullness, ear pressure, dizziness/vertigo, ear surgery, recent ear/sinus infections, recent falls, sinus/throat concerns, ear drainage, or sudden hearing loss.    EVALUATION      See Audiogram    RESULTS:    Otoscopic Evaluation:   Right Ear: Otoscopy revealed deep soft partially occluding cerumen and the tympanic membrane was unable able to be clearly visualized.   Left Ear: Otoscopy revealed a clear healthy canal and a healthy tympanic membrane was visualized.     Immittance:  Immittance Measures: 226 Hz   Right Ear: Tympanometric testing revealed a normal type A tympanogram with normal middle ear pressure  and normal static compliance.  Left Ear: Tympanometric testing revealed a normal type A tympanogram with normal middle ear pressure and normal static compliance.    Right Ear: Ipsilateral acoustic reflexes were present at, 500-4,000 Hz, at expected sensation levels.  Left Ear: Ipsilateral acoustic reflexes were present at, 500-4,000 Hz, at expected sensation levels.    Test technique:  Pure Tone Audiometry via insert earphones    Reliability:   excellent    Pure Tone Audiometry:    Right Ear: Audiometric testing indicated normal peripheral hearing sensitivity through 3,000 Hz, sloping to a slight sensorineural hearing loss at 4,000 Hz, sloping to a mild to moderate sensorineural hearing loss above.   Left Ear:   Audiometric testing indicated normal peripheral hearing sensitivity through 3,000 Hz, sloping to a slight sensorineural hearing loss at 4,000 Hz, sloping to a mild sensorineural hearing loss above.       Speech Audiometry:   Right Ear:  Speech Reception Threshold (SRT) was obtained at 15 dBHL                  Word Recognition scores were excellent (100%) in quiet when words were presented at 55 dBHL  Left Ear:  Speech Reception Threshold (SRT) was obtained at 15 dBHL                 Word Recognition scores were excellent (100%) in quiet when words were presented at 55 dBHL  Testing was performed with recorded NU-6 speech words in quiet. Speech thresholds were in good agreement with the pure tone averages in each ear.     IMPRESSIONS:  Today's test results are  consistent with a mild sensorineural hearing loss, bilaterally .  The patient was counseled with regard to the findings.    When compared to the previous test results of 11/6/19, there has been significant improvement in each ear.     RECOMMENDATIONS:  * Continue medical follow up with ERICA Ayers.  * Retest as medically indicated, or sooner if a change in hearing sensitivity or tinnitus is noticed.   * Wear hearing protection while in  the presence of loud sounds.   * Use tinnitus coping strategies as needed, such as sound apps on a smart phone, utilizing calming noise in the room, running a fan at night, etc.   * Use effective communication strategies such as asking the speaker to gain attention prior to speaking, speaking in the same room, repeating words that were heard, etc.    PATIENT EDUCATION:   Discussed results and recommendations with the patient and a copy of the hearing test was provided.  Questions were addressed and the patient was encouraged to contact our department should concerns arise.  The patient was seen from  11:00-11:30 am.

## 2024-01-26 ENCOUNTER — EVALUATION (OUTPATIENT)
Dept: PHYSICAL THERAPY | Facility: CLINIC | Age: 59
End: 2024-01-26
Payer: COMMERCIAL

## 2024-01-26 DIAGNOSIS — M25.60 JOINT STIFFNESS: ICD-10-CM

## 2024-01-26 DIAGNOSIS — M26.69: ICD-10-CM

## 2024-01-26 DIAGNOSIS — M26.609 TMJ DYSFUNCTION: Primary | ICD-10-CM

## 2024-01-26 DIAGNOSIS — H93.13 TINNITUS OF BOTH EARS: ICD-10-CM

## 2024-01-26 PROCEDURE — 97162 PT EVAL MOD COMPLEX 30 MIN: CPT | Mod: GP | Performed by: PHYSICAL THERAPIST

## 2024-01-26 PROCEDURE — 97140 MANUAL THERAPY 1/> REGIONS: CPT | Mod: GP | Performed by: PHYSICAL THERAPIST

## 2024-01-26 PROCEDURE — 97110 THERAPEUTIC EXERCISES: CPT | Mod: GP | Performed by: PHYSICAL THERAPIST

## 2024-01-26 ASSESSMENT — ENCOUNTER SYMPTOMS
OCCASIONAL FEELINGS OF UNSTEADINESS: 0
LOSS OF SENSATION IN FEET: 0
DEPRESSION: 0

## 2024-01-26 NOTE — PROGRESS NOTES
Physical Therapy  Physical Therapy Evaluation    Patient Name: Karolina Vogt  MRN: [id number]  Today's Date: 1/26/2024   Visit #1  Time in: 1015  Time out: 1115  [facility] AFTER EVAL     Assessment   Pt. presents with chronic TMJ and facial pain, with hypertonicity of the mm's of mastication on the R side, R sided neck m. tightness and subtle contralateral ROM limitations, leftward C jaw deviation in terminal opening, and functional difficulties with head/neck movement, jaw opening/closing, eating, chewing, etc. She will benefit from manual therapies to lessen m. guarding and tonus, including but not limited to: soft tissue mobilization, dry needling, m. energy techniques, and joint mobilizations, from therapeutic exercise via strengthening and stretching to improve upper quarter posture, from neuromuscular re education via cervical joint position error testing and treatment, along with mirror-aided jaw tracking/coordination drills. Pt. may benefit from therapeutic activities via guidance in using a basic mouth guard to protect teeth and via activity modification strategies regarding eating/chewing, sleeping positions/ergonomics, etc.    Plan   Having pt. follow up weekly for 4-5 weeks. Anticipating 6 total visits in 8 weeks' time (requesting 1x/week for 8 weeks to have a bit of flexibility, should we need it).     Current Problem  1. TMJ dysfunction  Referral to Physical Therapy      2. Joint stiffness        3. Tinnitus of both ears  Referral to Physical Therapy      4. Crepitus of temporomandibular joint on opening of jaw  Referral to Physical Therapy        Subjective   Pt. reports that she passed out (dt COPD and desaturating) 15y ago and fell directly onto the R side of her face. She reports that she broke her nose and several bones in the R side of her jaw/face, and had to have major surgery to address these fx's. She reports that, ever since then, she has had TMD, R sided face pain and HA, and  "\"crickets\" and tinnitus in B ears. In the time since, these things have been annoying, but she has mostly learned to work around it (she reports chewing in circular patterns because straight up and down compression makes her symptoms worse, she performs self massage for her R neck and face mm's, etc.). She reports, however, that things have been worsening in general for the past ~1+ year, and she can no longer live with it.     Pt. does not use any night time appliances or , but does grind her teeth (both during the day and while sleeping). She sees a dentist for cleanings regularly, but that person has not made any splinting or tx recommendations, to this point. She reports that her COPD issues are managed- and that she typically has more warning if she needs to sit/rest (no recent falls, etc.). She saw her ENT recently, who reportedly cleaned out her ears, and she didn't notice much difference in her pain nor hearing (note on file describes some hearing loss B).     PMH otherwise includes emphysema, lung disease, migraines, psych problems, and thyroid disorder. Pt. is a half pack/day smoker x38y. She takes Tylenol PRN, but reports that she can't take much beyond that because of interactions with her psych meds. She is active with mental health care. She is retired, but works part time at [employer] in [place]. Pt. goes by \"Lakeisha\".    Her written goal on intake paperwork is to, \"lose the crickets/ringing in R ear and ease pain\".    Objective   Posture:   Mild forward head and B rounded shoulder posture.    Range of Motion:   Gross cervical flexion, extension, and rotation range WNL in all directions. L side bending ~40 deg. vs. 50 R. The former is, \"tighter\", but not generally painful.    Gross jaw opening range WFL. Slight leftward \"C\" deviation noted as approaching end range- consistent across repetitions.    Strength:   UE myotomes WNL (all)    Flexibility:   Dec'd length noted R sub occipital " "mm's, upper trapezius, and levator scapula. Scalenes WNL.    Palpation:   Tonus/tenderness noted throughout R side neck musculature (anterior scalene the most obviously so), much more so about R masseter.     Special Tests:   UE dermatomes WNL (all)  UE deep tendon reflexes- 2+ all. Jalloh's (-)    Gait:   Reciprocal and steady without an assistive device.    Balance:   STEADI fall risk assessment score: 0    Treatment and coding:  PT Evaluation- moderate complexity (04428): 30 minutes  Classified as such dt variability of symptom intensity, chronicity of impairments, and presence of relevant comorbidities that might play some degree of a role (see subjective).     Manual Therapy (25258): 15 minutes  Soft tissue mobilization provided to R scalenes, SCM, levator scapula, and upper trapezius in supine, including petrissage, stripping, cross frictions, and MFR to tolerance to promote local blood flow and soft tissue extensibility.  Pt. reports doing a lot of this herself, intuitively, as well. Encouraging her to continue when able.    Therapeutic Exercise (01163): 15 minutes  Supine upper cervical nods x10. Too painful. Pt. advised to perform in ~50% range/ to tolerance. Only straight-on, for now.   R arm overhead strain/counterstrain technique issued for home (trialed 1 min)  Jaw depression isometrics into thumb, 1s pauses, x10. Added for pain relief and for pt. to do before meals.  Handout provided for reference.    Goals:  Pt. will report 50% dec. in jaw/facial pain with eating within 5 weeks.   Pt. will enjoy a steak dinner without jaw pain during or afterwards within 8 weeks.   Pt. will have full L cervical SB to demonstrate dec'd R sided m. hypertonicity, for looking around to retrieve items at work, etc., within 6 weeks.  Pt. will report minimal tinnitus or \"crickets\" at rest within 7 weeks.  Pt. will be independent with HEP by discharge.   Pt. will go x1 full work shift without facial/head pain within 4 weeks. "   Pt. will have 0% impairment self rating on TMD Disablity Index within 7 weeks.

## 2024-02-01 ENCOUNTER — TELEPHONE (OUTPATIENT)
Dept: PRIMARY CARE | Facility: CLINIC | Age: 59
End: 2024-02-01
Payer: COMMERCIAL

## 2024-02-01 DIAGNOSIS — E78.2 MIXED HYPERLIPIDEMIA: ICD-10-CM

## 2024-02-01 RX ORDER — SIMVASTATIN 20 MG/1
20 TABLET, FILM COATED ORAL DAILY
Qty: 90 TABLET | Refills: 1 | Status: SHIPPED | OUTPATIENT
Start: 2024-02-01

## 2024-02-05 DIAGNOSIS — G43.109 MIGRAINE WITH VISUAL AURA: Primary | ICD-10-CM

## 2024-02-05 RX ORDER — UBROGEPANT 100 MG/1
100 TABLET ORAL AS NEEDED
COMMUNITY
Start: 2023-12-27 | End: 2024-02-05 | Stop reason: SDUPTHER

## 2024-02-05 RX ORDER — UBROGEPANT 100 MG/1
100 TABLET ORAL AS NEEDED
Qty: 16 TABLET | Refills: 0 | Status: SHIPPED | OUTPATIENT
Start: 2024-02-05 | End: 2024-04-05 | Stop reason: SDUPTHER

## 2024-02-08 ENCOUNTER — TELEPHONE (OUTPATIENT)
Dept: PULMONOLOGY | Facility: CLINIC | Age: 59
End: 2024-02-08
Payer: COMMERCIAL

## 2024-02-08 NOTE — TELEPHONE ENCOUNTER
Patient R/S her at to May 22 but also said she has a respiratory infection and asked you if you     Can send in anything for her I also let her know that she can call her PCP for medication as well.

## 2024-02-09 ENCOUNTER — TREATMENT (OUTPATIENT)
Dept: PHYSICAL THERAPY | Facility: CLINIC | Age: 59
End: 2024-02-09
Payer: COMMERCIAL

## 2024-02-09 ENCOUNTER — TELEPHONE (OUTPATIENT)
Dept: PULMONOLOGY | Facility: CLINIC | Age: 59
End: 2024-02-09

## 2024-02-09 DIAGNOSIS — M26.609 TMJ DYSFUNCTION: ICD-10-CM

## 2024-02-09 DIAGNOSIS — H93.13 TINNITUS OF BOTH EARS: Primary | ICD-10-CM

## 2024-02-09 DIAGNOSIS — M26.609 UNSPECIFIED TEMPOROMANDIBULAR JOINT DISORDER, UNSPECIFIED SIDE: ICD-10-CM

## 2024-02-09 DIAGNOSIS — M25.60 JOINT STIFFNESS: ICD-10-CM

## 2024-02-09 DIAGNOSIS — M26.69: ICD-10-CM

## 2024-02-09 PROCEDURE — 97140 MANUAL THERAPY 1/> REGIONS: CPT | Mod: GP | Performed by: PHYSICAL THERAPIST

## 2024-02-09 NOTE — PROGRESS NOTES
"Physical Therapy  Physical Therapy Treatment    Patient Name: Karolina Vogt  MRN: [id number]  Today's Date: 2/9/2024   Visit #2 (of 8 by 4/15/24)  Time in: 1045  Time out: 1115    Assessment:   Pt.'s sub occipital area seems to loosen up well after needling. Posterior scalene and levator might need similar attention next visit, if pt. finds today's treatment to be helpful and she's not too sore, etc., afterwards.    Plan:   Pt. to schedule weekly for the immediate future.    Current Problem  1. Tinnitus of both ears        2. TMJ dysfunction        3. Crepitus of temporomandibular joint on opening of jaw        4. Joint stiffness          Subjective    Pt. 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 Pt. (or someone else) has to be with her at all times, and pt. is helping her with all ADLs and IADLs, in addition to her normal job.    Objective   Treatments:  Manual Therapy (93035): 30 minutes  Reviewed HEP verbally.   Pt. educated on dry needling, including indications, risks, what to expect, etc., and she provides verbal consent to perform.   Dry needling provided to R rectus capitis posterior major and minor in L side lying, x2 each in series, with TENS added to elicit local twitch responses to dec resting tonus and associated discomfort.  Soft tissue mobilization provided to R SCM, scalenes, levator scapula, and UT in supine, including petrissage, stripping, cross frictions, and MFR to tolerance to promote local blood flow and soft tissue extensibility.  Upper trapezius stretching (manual), 3x 30s per side  Sub occipital release, 30s holds, vs. 20s of \"figure 8's\" sub occipital soft tissue petrissage, 5x  "

## 2024-02-09 NOTE — TELEPHONE ENCOUNTER
,    Patient called.    Would like an antibiotic/steroid called into [facility] on [address].    She feels like she has an infection in her lungs.    Please advise.

## 2024-02-10 DIAGNOSIS — J44.1 ACUTE EXACERBATION OF CHRONIC OBSTRUCTIVE PULMONARY DISEASE (COPD) (MULTI): Primary | ICD-10-CM

## 2024-02-10 RX ORDER — PREDNISONE 10 MG/1
TABLET ORAL
Qty: 40 EACH | Refills: 0 | Status: SHIPPED | OUTPATIENT
Start: 2024-02-10 | End: 2024-02-26

## 2024-02-10 RX ORDER — DOXYCYCLINE 100 MG/1
100 TABLET ORAL 2 TIMES DAILY
Qty: 14 TABLET | Refills: 0 | Status: SHIPPED | OUTPATIENT
Start: 2024-02-10 | End: 2024-02-17

## 2024-02-12 NOTE — TELEPHONE ENCOUNTER
Patient feels that she has a respiratory infection and asked if you could sent in some     medication for her.

## 2024-02-15 ENCOUNTER — APPOINTMENT (OUTPATIENT)
Dept: PHYSICAL THERAPY | Facility: CLINIC | Age: 59
End: 2024-02-15
Payer: COMMERCIAL

## 2024-03-01 ENCOUNTER — TREATMENT (OUTPATIENT)
Dept: PHYSICAL THERAPY | Facility: CLINIC | Age: 59
End: 2024-03-01
Payer: COMMERCIAL

## 2024-03-01 DIAGNOSIS — H93.13 TINNITUS OF BOTH EARS: ICD-10-CM

## 2024-03-01 DIAGNOSIS — M26.609 TMJ DYSFUNCTION: Primary | ICD-10-CM

## 2024-03-01 DIAGNOSIS — M26.69: ICD-10-CM

## 2024-03-01 DIAGNOSIS — M25.60 JOINT STIFFNESS: ICD-10-CM

## 2024-03-01 DIAGNOSIS — M26.609 UNSPECIFIED TEMPOROMANDIBULAR JOINT DISORDER, UNSPECIFIED SIDE: ICD-10-CM

## 2024-03-01 PROCEDURE — 97140 MANUAL THERAPY 1/> REGIONS: CPT | Mod: GP | Performed by: PHYSICAL THERAPIST

## 2024-03-01 NOTE — PROGRESS NOTES
Physical Therapy  Physical Therapy Treatment    Patient Name: Karolina Vogt  MRN: [id number]  Today's Date: 3/1/2024   Visit #3 (of 8 by 4/15/24)  Time in: 0745  Time out: 0815    Assessment:   Pt. tolerates treatment well. The rectus muscles we worked on last time don't seem as tense nor tender today, so hopefully OCS will follow suit. Adding 3 way retractions for sub occipital stretching/ self mobilization.     Plan:   Pt. returns in a few weeks.    Current Problem  1. TMJ dysfunction        2. Tinnitus of both ears        3. Crepitus of temporomandibular joint on opening of jaw        4. Joint stiffness        5. Unspecified temporomandibular joint disorder, unspecified side  Follow Up In Physical Therapy        Subjective    Pt. reports that her head/neck range of motion seems better, and she has been doing her stretches regularly, but her tinnitus and pain seem to be largely the same. She has been promoted at work and is enjoying it.    Objective   Treatments:  Manual Therapy (53936): 30 minutes  Palpatory assessment performed.   Dry needling provided to R obliquus capitis superior and upper trapezius in L side lying, x2 each in series, with TENS added to elicit local twitch responses to dec resting tonus and associated discomfort.  Soft tissue mobilization provided to B SCM, scalenes, UT, and sub occipitals in supine, including petrissage, stripping, cross frictions, and MFR to tolerance to promote local blood flow and soft tissue extensibility.  Sub occipital release, 5x 30s holds.    Seated 3 way chin tucks, 2x5 each. Pt. reports feeling tighter on the R from both rotated positions, but not undue pain. Added for home, to be done 3-4x/day.

## 2024-03-11 DIAGNOSIS — J44.9 CHRONIC OBSTRUCTIVE PULMONARY DISEASE, UNSPECIFIED COPD TYPE (MULTI): ICD-10-CM

## 2024-03-13 ENCOUNTER — APPOINTMENT (OUTPATIENT)
Dept: PHYSICAL THERAPY | Facility: CLINIC | Age: 59
End: 2024-03-13
Payer: COMMERCIAL

## 2024-03-13 RX ORDER — ROFLUMILAST 500 UG/1
500 TABLET ORAL DAILY
Qty: 30 TABLET | Refills: 11 | Status: SHIPPED | OUTPATIENT
Start: 2024-03-13 | End: 2025-03-13

## 2024-03-22 ENCOUNTER — TREATMENT (OUTPATIENT)
Dept: PHYSICAL THERAPY | Facility: CLINIC | Age: 59
End: 2024-03-22
Payer: COMMERCIAL

## 2024-03-22 DIAGNOSIS — M26.609 TMJ DYSFUNCTION: ICD-10-CM

## 2024-03-22 DIAGNOSIS — M26.69: ICD-10-CM

## 2024-03-22 DIAGNOSIS — M25.60 JOINT STIFFNESS: ICD-10-CM

## 2024-03-22 DIAGNOSIS — H93.13 TINNITUS OF BOTH EARS: ICD-10-CM

## 2024-03-22 DIAGNOSIS — M26.609 UNSPECIFIED TEMPOROMANDIBULAR JOINT DISORDER, UNSPECIFIED SIDE: Primary | ICD-10-CM

## 2024-03-22 PROCEDURE — 97140 MANUAL THERAPY 1/> REGIONS: CPT | Mod: GP | Performed by: PHYSICAL THERAPIST

## 2024-03-22 NOTE — PROGRESS NOTES
"Physical Therapy  Physical Therapy Treatment    Patient Name: Karolina Vogt  MRN: [id number]  Today's Date: 3/22/2024  Visit #4 (of 5 by 4/15/24)  Time in: 0745  Time out: 0825    Assessment:   Pt. tolerates treatment well. The muscles on the R side of her neck are generally stiffer/more sensitive to pressure, etc., than those on the L, but seem to be easing up in general.     Plan:   Pt. has one more scheduled visit when we'll likely discharge to HEP.    Current Problem  1. Unspecified temporomandibular joint disorder, unspecified side  Follow Up In Physical Therapy      2. Tinnitus of both ears        3. Crepitus of temporomandibular joint on opening of jaw        4. Joint stiffness        5. TMJ dysfunction          Subjective    Pt. reports that her pain seems a bit better, and that her m. tightness seems related to stress. The tinnitus is now R sided, but constantly a 10/10 unless something distracts her from it (ambient noise, etc.). No issue with HEP. Pt. reports that she felt, \"relaxed\" after last visit.    Objective   Treatments:  Manual Therapy (17418): 40 minutes  Screened vibration/massage gun stimulation x20s each about R SCM, scalenes, levator, and UT. None change baseline tinnitus afterwards.   Palpatory assessment performed.   Dry needling provided to R OCS and posterior scalene in L side lying, x2 each in series, with TENS added to elicit local twitch responses to dec resting tonus and associated discomfort.  Soft tissue mobilization provided to each side SCM, scalenes, UT, and levator scap in supine, including petrissage, stripping, cross frictions, and MFR to tolerance to promote local blood flow and soft tissue extensibility.  Sub occipital release 5x30s- \"figure of 8's\" STM for sub occipital area for ~10s between each.  Reviewed HEP verbally- keeping the same.  "

## 2024-03-27 ENCOUNTER — OFFICE VISIT (OUTPATIENT)
Dept: PRIMARY CARE | Facility: CLINIC | Age: 59
End: 2024-03-27
Payer: COMMERCIAL

## 2024-03-27 ENCOUNTER — HOSPITAL ENCOUNTER (OUTPATIENT)
Dept: RADIOLOGY | Facility: CLINIC | Age: 59
Discharge: HOME | End: 2024-03-27
Payer: COMMERCIAL

## 2024-03-27 VITALS
HEIGHT: 64 IN | RESPIRATION RATE: 20 BRPM | BODY MASS INDEX: 29.53 KG/M2 | SYSTOLIC BLOOD PRESSURE: 118 MMHG | DIASTOLIC BLOOD PRESSURE: 77 MMHG | HEART RATE: 88 BPM | WEIGHT: 173 LBS | OXYGEN SATURATION: 91 % | TEMPERATURE: 97.7 F

## 2024-03-27 DIAGNOSIS — M17.12 PRIMARY OSTEOARTHRITIS OF LEFT KNEE: ICD-10-CM

## 2024-03-27 DIAGNOSIS — M25.562 ACUTE PAIN OF LEFT KNEE: Primary | ICD-10-CM

## 2024-03-27 DIAGNOSIS — M25.562 ACUTE PAIN OF LEFT KNEE: ICD-10-CM

## 2024-03-27 PROCEDURE — 73562 X-RAY EXAM OF KNEE 3: CPT | Mod: LT

## 2024-03-27 PROCEDURE — 73562 X-RAY EXAM OF KNEE 3: CPT | Mod: LEFT SIDE | Performed by: RADIOLOGY

## 2024-03-27 PROCEDURE — 3008F BODY MASS INDEX DOCD: CPT

## 2024-03-27 PROCEDURE — 99213 OFFICE O/P EST LOW 20 MIN: CPT

## 2024-03-27 RX ORDER — DICLOFENAC SODIUM 10 MG/G
4 GEL TOPICAL 4 TIMES DAILY PRN
Qty: 100 G | Refills: 1 | Status: SHIPPED | OUTPATIENT
Start: 2024-03-27 | End: 2025-03-27

## 2024-03-27 RX ORDER — FLUTICASONE FUROATE, UMECLIDINIUM BROMIDE AND VILANTEROL TRIFENATATE 100; 62.5; 25 UG/1; UG/1; UG/1
1 POWDER RESPIRATORY (INHALATION) DAILY
COMMUNITY

## 2024-03-27 RX ORDER — HALOPERIDOL 5 MG/1
5 TABLET ORAL DAILY
COMMUNITY

## 2024-03-27 ASSESSMENT — ENCOUNTER SYMPTOMS: ARTHRALGIAS: 0

## 2024-03-27 NOTE — PROGRESS NOTES
"Subjective   Patient ID: Karolina Vogt is a 59 y.o. female who presents for Joint Swelling (Left knee is swollen x 1 1/2 weeks. No known injury).    HPI     Karolina is here for left knee pain and swelling for the past week.  Denies any injury.  She does state that she is on her feet a lot with her work. Swelling has some what improved.    Has not tried any interventions at home.  Denies fever, chills, redness around knee..    She is still smoking.  She has the precontemplation phase of smoking.  Does not plan to quit anytime soon.    Review of Systems   Musculoskeletal:  Negative for arthralgias.     Objective   /77 (BP Location: Right arm, Patient Position: Sitting)   Pulse 88   Temp 36.5 °C (97.7 °F)   Resp 20   Ht 1.626 m (5' 4\")   Wt 78.5 kg (173 lb)   SpO2 91%   BMI 29.70 kg/m²     Physical Exam  Constitutional:       General: She is not in acute distress.     Appearance: Normal appearance.   HENT:      Head: Normocephalic.   Musculoskeletal:      Comments: Full range of motion of right and left knee.  Negative pain with varus and valgus strain of left knee. Very minimal swelling to left knee compared to right.  She does have focal tenderness to palpation at the medial joint line.  Negative anterior posterior drawer.   Neurological:      Mental Status: She is alert.         Assessment/Plan   Problem List Items Addressed This Visit             ICD-10-CM    Osteoarthritis M19.90    Relevant Medications    diclofenac sodium (Voltaren) 1 % gel     Other Visit Diagnoses         Codes    Acute pain of left knee    -  Primary M25.562    Relevant Orders    XR knee left 3 views        This is a 59-year-old female presenting with 1 week of knee pain and intermittent swelling.  She does state that the swelling has much improved compared to earlier.  X-ray in the office does show some mild narrowing of the knee compared to 2021.  No obvious bone spurring.    Suspect she has some inflammation due to OA of her " left knee exacerbated by prolonged standing or walking.  Low suspicion for gout, septic arthritis.  Patient not interested in corticosteroid injection at this time.  She is also hesitant to starting physical therapy because she is also in PT for her TMJ and neck and has limited days that will be covered by insurance.  Recommend RICE therapy, diclofenac gel as needed, provided knee exercises to perform at home mainly focusing on quad strengthening.    She is to follow-up if not better.  Consider joint injection at that time.    Discussed with Attending,    Ni Shrestha, DO PGY-3

## 2024-03-27 NOTE — PATIENT INSTRUCTIONS
Smoking cessation. 1-800-QUIT-NOW for free resources.    Voltaren gel for left knee as needed.    Tylenol 1000mg three times a day as needed.    Exercise print out.  Focus on exercises that will strengthen the upper muscles of the legs which include qauds and adductor muscles.

## 2024-03-28 NOTE — PROGRESS NOTES
I saw and evaluated the patient. I personally obtained the key and critical portions of the history and physical exam or was physically present for key and critical portions performed by the resident. I reviewed the resident/fellow's documentation and discussed the patient with the resident/fellow. I agree with the resident/fellow's medical decision making as documented in the note.  Patient has been on her feet for longer periods now that she has been working.  She does not recall any distinct injury.  She noticed her left knee started hurting her began to swell up on her.  No calf pain, no calf swelling.  Calves are equal in length.  She does not recall any previous injuries.  Does feel stiff when she bends it.  Patient has some swelling of her left knee, negative ligament laxity, some tenderness with meniscal testing.  Will get an x-ray, patient will try Voltaren gel ice exercises will see if this helps if not she may need to see orthopedics, get an MRI or receive an injection.  Has been going on for few weeks so less likely gout.  Appears to be more of a osteo arthritis type flare.  If any increase swelling any pain any redness any numbness tingling worsening pain any swelling in the calf notify the office to go to the ER  Follow-up in 2 to 3 weeks  Agree with assessment plan    Jason Ayala, DO

## 2024-03-29 ENCOUNTER — TREATMENT (OUTPATIENT)
Dept: PHYSICAL THERAPY | Facility: CLINIC | Age: 59
End: 2024-03-29
Payer: COMMERCIAL

## 2024-03-29 DIAGNOSIS — M26.609 TMJ DYSFUNCTION: Primary | ICD-10-CM

## 2024-03-29 DIAGNOSIS — M26.609 UNSPECIFIED TEMPOROMANDIBULAR JOINT DISORDER, UNSPECIFIED SIDE: ICD-10-CM

## 2024-03-29 DIAGNOSIS — M26.69: ICD-10-CM

## 2024-03-29 DIAGNOSIS — M25.60 JOINT STIFFNESS: ICD-10-CM

## 2024-03-29 DIAGNOSIS — H93.13 TINNITUS OF BOTH EARS: ICD-10-CM

## 2024-03-29 PROCEDURE — 97140 MANUAL THERAPY 1/> REGIONS: CPT | Mod: GP | Performed by: PHYSICAL THERAPIST

## 2024-03-29 PROCEDURE — 97110 THERAPEUTIC EXERCISES: CPT | Mod: GP | Performed by: PHYSICAL THERAPIST

## 2024-03-29 NOTE — PROGRESS NOTES
"Physical Therapy  Physical Therapy Treatment    Patient Name: Karolina Vogt  MRN: [id number]  Today's Date: 3/29/2024   Visit #5 (of 5 by 4/15/24)  Time in: 0750  Time out: 0830    Assessment:   Pt. has made excellent progress. We're going to see how she does with continuing her HEP. I remain here if she has questions or feels like she needs to return for further care later.    Plan:   Pt. discharged from PT.    Current Problem  1. TMJ dysfunction        2. Unspecified temporomandibular joint disorder, unspecified side  Follow Up In Physical Therapy      3. Tinnitus of both ears        4. Crepitus of temporomandibular joint on opening of jaw        5. Joint stiffness          Subjective    Pt. reports that her neck is feeling, \"pretty good\", but her jaw is still clicking when she opens and again right before she closes her mouth, and she continues to chew her food in an unconventional way (more like grinding side to side, in general). She's otherwise excited for a 4d weekend.    Objective   Treatments:  Manual Therapy (99816): 30 minutes  Palpatory assessment performed.   Dry needling provided to R OCS, posterior scalene, and masseter in L side lying, x2 each in series, with TENS added to elicit local twitch responses to dec resting tonus and associated discomfort.  Soft tissue mobilization provided to to the same, including petrissage, stripping, cross frictions, and MFR to tolerance to promote local blood flow and soft tissue extensibility.    Therapeutic Exercise (74491): 10 minutes  Reviewed jaw opening/closing mechanics and role of the TM disc.  Protrusion to slow lowering, 2x10, to work on eccentric control of pterygoids. Pt. denies pain. To be done intermittently throughout the day.   Keeping HEP otherwise the same.    "

## 2024-04-01 DIAGNOSIS — M25.562 ACUTE PAIN OF LEFT KNEE: Primary | ICD-10-CM

## 2024-04-01 DIAGNOSIS — R93.6 ABNORMAL X-RAY OF KNEE: ICD-10-CM

## 2024-04-01 NOTE — RESULT ENCOUNTER NOTE
Patient's x-ray shows an area where the bone may have actually or his infarct/dying or sequela from a previous injury.  Patient needs to see orthopedic ASAP.  If they are not able to get her in today or tomorrow she should go to the walk-in clinic in  [facility].  Please have the  notify me when she is scheduled for that she is heading to the walk-in clinic

## 2024-04-03 ENCOUNTER — APPOINTMENT (OUTPATIENT)
Dept: OTOLARYNGOLOGY | Facility: CLINIC | Age: 59
End: 2024-04-03
Payer: COMMERCIAL

## 2024-04-04 ENCOUNTER — APPOINTMENT (OUTPATIENT)
Dept: NEUROLOGY | Facility: CLINIC | Age: 59
End: 2024-04-04
Payer: COMMERCIAL

## 2024-04-05 ENCOUNTER — OFFICE VISIT (OUTPATIENT)
Dept: NEUROLOGY | Facility: CLINIC | Age: 59
End: 2024-04-05
Payer: COMMERCIAL

## 2024-04-05 VITALS
DIASTOLIC BLOOD PRESSURE: 81 MMHG | SYSTOLIC BLOOD PRESSURE: 104 MMHG | HEIGHT: 64 IN | WEIGHT: 182.8 LBS | BODY MASS INDEX: 31.21 KG/M2

## 2024-04-05 DIAGNOSIS — G43.109 MIGRAINE WITH VISUAL AURA: ICD-10-CM

## 2024-04-05 DIAGNOSIS — K21.9 GASTROESOPHAGEAL REFLUX DISEASE WITHOUT ESOPHAGITIS: ICD-10-CM

## 2024-04-05 PROCEDURE — 99214 OFFICE O/P EST MOD 30 MIN: CPT | Performed by: NURSE PRACTITIONER

## 2024-04-05 RX ORDER — UBROGEPANT 100 MG/1
100 TABLET ORAL AS NEEDED
Qty: 16 TABLET | Refills: 6 | Status: SHIPPED | OUTPATIENT
Start: 2024-04-05

## 2024-04-05 RX ORDER — CLOMIPRAMINE HYDROCHLORIDE 50 MG/1
100 CAPSULE ORAL NIGHTLY
COMMUNITY
Start: 2024-03-30

## 2024-04-05 RX ORDER — DEXLANSOPRAZOLE 60 MG/1
1 CAPSULE, DELAYED RELEASE ORAL DAILY
Qty: 30 CAPSULE | Refills: 3 | Status: SHIPPED | OUTPATIENT
Start: 2024-04-05 | End: 2024-05-16 | Stop reason: SDUPTHER

## 2024-04-05 ASSESSMENT — PATIENT HEALTH QUESTIONNAIRE - PHQ9
1. LITTLE INTEREST OR PLEASURE IN DOING THINGS: NOT AT ALL
2. FEELING DOWN, DEPRESSED OR HOPELESS: NOT AT ALL
1. LITTLE INTEREST OR PLEASURE IN DOING THINGS: NOT AT ALL
SUM OF ALL RESPONSES TO PHQ9 QUESTIONS 1 & 2: 0
2. FEELING DOWN, DEPRESSED OR HOPELESS: NOT AT ALL
SUM OF ALL RESPONSES TO PHQ9 QUESTIONS 1 AND 2: 0

## 2024-04-05 ASSESSMENT — ENCOUNTER SYMPTOMS
LOSS OF SENSATION IN FEET: 0
DEPRESSION: 0
OCCASIONAL FEELINGS OF UNSTEADINESS: 0

## 2024-04-10 ENCOUNTER — OFFICE VISIT (OUTPATIENT)
Dept: ORTHOPEDIC SURGERY | Facility: CLINIC | Age: 59
End: 2024-04-10
Payer: COMMERCIAL

## 2024-04-10 DIAGNOSIS — R93.89 ABNORMAL RADIOGRAPH: ICD-10-CM

## 2024-04-10 DIAGNOSIS — M25.462 EFFUSION OF LEFT KNEE: Primary | ICD-10-CM

## 2024-04-10 PROCEDURE — 99213 OFFICE O/P EST LOW 20 MIN: CPT | Performed by: PEDIATRICS

## 2024-04-10 NOTE — PATIENT INSTRUCTIONS
MRI ORDERS:  An order for MRI has been placed for you. Please call [phone] to schedule at a  facility.   Should you choose to have it done outside of , you will need to bring a copy of the images on CD from the location you have it done.    An MRI (magnetic resonance imaging) is a special test that needs prior authorization from your insurance. The prior authorization is obtained by the location where you have the MRI done. Once you schedule the exam, the approval request  begins and may take 10 days. Before you have the study completed, confirm the MRI has been approved. If the test gets denied by your insurance, we may be able to contest the decision. Should you learn it has been denied, please call out office to let us know.   Once scheduled, please call [phone] to schedule follow up appointment to review the MRI with Dr. Goldberg. Sometimes this may be done virtually.      Diagnosis, evaluation, and treatment options were explained to patient in detail and questions answered.     Call Pediatric Sports Medicine Office @ [phone] if any difficulty or for any further concerns

## 2024-04-10 NOTE — LETTER
April 11, 2024     Jason Ayala DO  [address]  [facility], [address]  [place] [address]    Patient: Karolina Vogt   YOB: 1965   Date of Visit: 4/10/2024       Dear Dr. Jason Ayala, DO:    Thank you for referring Karolina Vogt to me for evaluation. Below are my notes for this consultation.  If you have questions, please do not hesitate to call me. I look forward to following your patient along with you.       Sincerely,     Laura D Goldberg, MD      CC: No Recipients  ______________________________________________________________________________________    Consulting physician: Jason Ayala DO  A report with my findings and recommendations will be sent to the primary and referring physician via written or electronic means when information is available    History of Present Illness:  Karolina Vogt is a 59 y.o. female referred here by PCP for abnormal finding on knee xray. She presented to PCP with left knee pain and effusion for several weeks to month.   No known injury  No catch, Lock, or give way  + swelling  No redness or warmth    She saw her PCP who ordered an xray which has an abnormality so he referred her here.  She denies current pain but still has some swelling  No h/o knee pain or concerns in left knee  She has had some pain in the right knee in the pain but minimal.    Medications: Yes - ibuprofen helps some    Past [facility] HX:  Specialty Problems    None    Medications:   Current Outpatient Medications on File Prior to Visit   Medication Sig Dispense Refill   • albuterol 0.63 mg/3 mL nebulizer solution Inhale 3 mL every 4 hours.     • ALPRAZolam (Xanax) 1 mg tablet Take 1 tablet (1 mg) by mouth 2 times a day.     • clomiPRAMINE (Anafranil) 50 mg capsule Take 2 capsules (100 mg) by mouth once daily at bedtime.     • cyanocobalamin (Vitamin B-12) 500 mcg tablet Take 1 tablet (500 mcg) by mouth once daily.     • dexlansoprazole (Dexilant) 60 mg DR capsule Take 1  capsule (60 mg) by mouth once daily. 30 capsule 3   • diclofenac sodium (Voltaren) 1 % gel Apply 4.5 inches (4 g) topically 4 times a day as needed (left knee pain). (Patient not taking: Reported on 4/5/2024) 100 g 1   • haloperidol (Haldol) 5 mg tablet Take 1 tablet (5 mg) by mouth once daily.     • levothyroxine (Synthroid, Levoxyl) 50 mcg tablet Take 1 tablet (50 mcg) by mouth once daily. 30 tablet 2   • prazosin (Minipress) 5 mg capsule      • roflumilast (Daliresp) 500 mcg tablet Take 1 tablet (500 mcg) by mouth once daily. 30 tablet 11   • simvastatin (Zocor) 20 mg tablet Take 1 tablet (20 mg) by mouth once daily. 90 tablet 1   • Trelegy Ellipta 100-62.5-25 mcg blister with device Inhale 1 puff once daily.     • trihexyphenidyl (Artane) 5 mg tablet Take by mouth.     • Ubrelvy 100 mg tablet tablet Take 1 tablet (100 mg) by mouth if needed (migraine). 16 tablet 6   • [DISCONTINUED] dexlansoprazole (Dexilant) 60 mg DR capsule Take 1 capsule (60 mg) by mouth once daily. 30 capsule 3   • [DISCONTINUED] Ubrelvy 100 mg tablet tablet Take 1 tablet (100 mg) by mouth if needed (migraine). 16 tablet 0     No current facility-administered medications on file prior to visit.     Allergies:    Allergies   Allergen Reactions   • Nsaids (Non-Steroidal Anti-Inflammatory Drug) Nausea/vomiting   • Bupropion Seizure     seizures with wellbutrin   • Caffeine Unknown     vomitting   • Cephalosporins Nausea Only and Nausea/vomiting   • Diphenhydramine Seizure   • Erythromycin Nausea Only   • Gabapentin Unknown   • Pramipexole Unknown   • Tramadol Nausea Only and Other     PT REPORTS DOES NOT MIXX WELL WITH PSYCH MEDS   • Trazodone Unknown     night terrors   • Azithromycin Nausea Only and Rash        Physical Exam:  General appearance: Well-appearing well-nourished  Psych: Normal mood and affect  KNEE EXAM:    INSPECTION:  no soft tissue swelling, redness, or warmth  no skin changes  no deformities    FUNCTIONAL:  Gait normal  without limp  Single leg standing balance--> good  Single leg heel raise --> pronation?? NO  Single leg semi-squat--> contralateral hip drop with valgus knee     MOTION:  log roll: no hip pain with Full PROM MONI  HIP Flex to 90/knee to 90: no hip pain with Full PROM MONI  SLR: no low back pain or radicular pain MONI  Hip flexion: 5/5 strength MONI without pain    Knee: Full PROM without PAIN MONI  Knee ext: 5/5 MONI  Knee Flexion: 5/5 MONI    PALPATION:  Effusion: 1+  Peripatellar: mild medial TTP, Neg Grind, normal glide, neg tilt, neg apprehension  Joint line: medial joint TTP  Quad tendon: WNL, NTTP  Patella tendon: WNL, NTTP  McMurrays: no joint line pain or catch--> pain loading medial compartment, no catch  Bounce: NEG    Imaging: Findings suggestive of possible bone infarct of the left lateral femoral condyle versus possible posttraumatic sequela from previous osteochondral injury. MRI could be considered. Otherwise normal radiographs left knee.    Impression and Plan:  Karolina Vogt is a 59 y.o. female with   1. Effusion of left knee    2. Abnormal radiograph        DIagnosis, evaluation, and treatment options were explained to patient in detail and questions answered.   Home exercises were explained and included if appropriate.  Further treatment as discussed.  See Patient Instructions for more details of what was provided to patient with further information on diagnosis, evaluation, and treatment.     FOLLOW UP:  recommend MRI to further evaluate abnormal xray finding     Call Pediatric Sports Medicine Office [phone] if not improving as expected or any further concern.      ** Please excuse any errors in grammar or translation related to this dictation. Voice recognition software was utilized to prepare this document. **

## 2024-04-11 NOTE — PROGRESS NOTES
Consulting physician: Jason Ayala, DO  A report with my findings and recommendations will be sent to the primary and referring physician via written or electronic means when information is available    History of Present Illness:  Karolina Vogt is a 59 y.o. female referred here by PCP for abnormal finding on knee xray. She presented to PCP with left knee pain and effusion for several weeks to month.   No known injury  No catch, Lock, or give way  + swelling  No redness or warmth    She saw her PCP who ordered an xray which has an abnormality so he referred her here.  She denies current pain but still has some swelling  No h/o knee pain or concerns in left knee  She has had some pain in the right knee in the pain but minimal.    Medications: Yes - ibuprofen helps some    Past MSK HX:  Specialty Problems    None    Medications:   Current Outpatient Medications on File Prior to Visit   Medication Sig Dispense Refill    albuterol 0.63 mg/3 mL nebulizer solution Inhale 3 mL every 4 hours.      ALPRAZolam (Xanax) 1 mg tablet Take 1 tablet (1 mg) by mouth 2 times a day.      clomiPRAMINE (Anafranil) 50 mg capsule Take 2 capsules (100 mg) by mouth once daily at bedtime.      cyanocobalamin (Vitamin B-12) 500 mcg tablet Take 1 tablet (500 mcg) by mouth once daily.      dexlansoprazole (Dexilant) 60 mg DR capsule Take 1 capsule (60 mg) by mouth once daily. 30 capsule 3    diclofenac sodium (Voltaren) 1 % gel Apply 4.5 inches (4 g) topically 4 times a day as needed (left knee pain). (Patient not taking: Reported on 4/5/2024) 100 g 1    haloperidol (Haldol) 5 mg tablet Take 1 tablet (5 mg) by mouth once daily.      levothyroxine (Synthroid, Levoxyl) 50 mcg tablet Take 1 tablet (50 mcg) by mouth once daily. 30 tablet 2    prazosin (Minipress) 5 mg capsule       roflumilast (Daliresp) 500 mcg tablet Take 1 tablet (500 mcg) by mouth once daily. 30 tablet 11    simvastatin (Zocor) 20 mg tablet Take 1 tablet (20 mg) by mouth once  daily. 90 tablet 1    Trelegy Ellipta 100-62.5-25 mcg blister with device Inhale 1 puff once daily.      trihexyphenidyl (Artane) 5 mg tablet Take by mouth.      Ubrelvy 100 mg tablet tablet Take 1 tablet (100 mg) by mouth if needed (migraine). 16 tablet 6    [DISCONTINUED] dexlansoprazole (Dexilant) 60 mg DR capsule Take 1 capsule (60 mg) by mouth once daily. 30 capsule 3    [DISCONTINUED] Ubrelvy 100 mg tablet tablet Take 1 tablet (100 mg) by mouth if needed (migraine). 16 tablet 0     No current facility-administered medications on file prior to visit.     Allergies:    Allergies   Allergen Reactions    Nsaids (Non-Steroidal Anti-Inflammatory Drug) Nausea/vomiting    Bupropion Seizure     seizures with wellbutrin    Caffeine Unknown     vomitting    Cephalosporins Nausea Only and Nausea/vomiting    Diphenhydramine Seizure    Erythromycin Nausea Only    Gabapentin Unknown    Pramipexole Unknown    Tramadol Nausea Only and Other     PT REPORTS DOES NOT MIXX WELL WITH PSYCH MEDS    Trazodone Unknown     night terrors    Azithromycin Nausea Only and Rash        Physical Exam:  General appearance: Well-appearing well-nourished  Psych: Normal mood and affect  KNEE EXAM:    INSPECTION:  no soft tissue swelling, redness, or warmth  no skin changes  no deformities    FUNCTIONAL:  Gait normal without limp  Single leg standing balance--> good  Single leg heel raise --> pronation?? NO  Single leg semi-squat--> contralateral hip drop with valgus knee     MOTION:  log roll: no hip pain with Full PROM MONI  HIP Flex to 90/knee to 90: no hip pain with Full PROM MONI  SLR: no low back pain or radicular pain MONI  Hip flexion: 5/5 strength MONI without pain    Knee: Full PROM without PAIN MONI  Knee ext: 5/5 MONI  Knee Flexion: 5/5 MONI    PALPATION:  Effusion: 1+  Peripatellar: mild medial TTP, Neg Grind, normal glide, neg tilt, neg apprehension  Joint line: medial joint TTP  Quad tendon: WNL, NTTP  Patella tendon: WNL, NTTP  McMurrays:  no joint line pain or catch--> pain loading medial compartment, no catch  Bounce: NEG    Imaging: Findings suggestive of possible bone infarct of the left lateral femoral condyle versus possible posttraumatic sequela from previous osteochondral injury. MRI could be considered. Otherwise normal radiographs left knee.    Impression and Plan:  Karolina Vogt is a 59 y.o. female with   1. Effusion of left knee    2. Abnormal radiograph        DIagnosis, evaluation, and treatment options were explained to patient in detail and questions answered.   Home exercises were explained and included if appropriate.  Further treatment as discussed.  See Patient Instructions for more details of what was provided to patient with further information on diagnosis, evaluation, and treatment.     FOLLOW UP:  recommend MRI to further evaluate abnormal xray finding     Call Pediatric Sports Medicine Office [phone] if not improving as expected or any further concern.      ** Please excuse any errors in grammar or translation related to this dictation. Voice recognition software was utilized to prepare this document. **

## 2024-04-13 ENCOUNTER — HOSPITAL ENCOUNTER (EMERGENCY)
Facility: HOSPITAL | Age: 59
Discharge: HOME | End: 2024-04-13
Attending: EMERGENCY MEDICINE
Payer: COMMERCIAL

## 2024-04-13 ENCOUNTER — APPOINTMENT (OUTPATIENT)
Dept: RADIOLOGY | Facility: HOSPITAL | Age: 59
End: 2024-04-13
Payer: COMMERCIAL

## 2024-04-13 VITALS
WEIGHT: 172 LBS | HEART RATE: 72 BPM | SYSTOLIC BLOOD PRESSURE: 122 MMHG | OXYGEN SATURATION: 96 % | BODY MASS INDEX: 29.37 KG/M2 | TEMPERATURE: 96.8 F | DIASTOLIC BLOOD PRESSURE: 84 MMHG | HEIGHT: 64 IN | RESPIRATION RATE: 16 BRPM

## 2024-04-13 DIAGNOSIS — S82.62XA CLOSED FRACTURE OF DISTAL LATERAL MALLEOLUS OF LEFT FIBULA, INITIAL ENCOUNTER: Primary | ICD-10-CM

## 2024-04-13 PROCEDURE — 73610 X-RAY EXAM OF ANKLE: CPT | Mod: LT

## 2024-04-13 PROCEDURE — 90471 IMMUNIZATION ADMIN: CPT

## 2024-04-13 PROCEDURE — 90715 TDAP VACCINE 7 YRS/> IM: CPT

## 2024-04-13 PROCEDURE — 73630 X-RAY EXAM OF FOOT: CPT | Mod: LT

## 2024-04-13 PROCEDURE — 29515 APPLICATION SHORT LEG SPLINT: CPT | Mod: LT

## 2024-04-13 PROCEDURE — 73630 X-RAY EXAM OF FOOT: CPT | Mod: LEFT SIDE | Performed by: RADIOLOGY

## 2024-04-13 PROCEDURE — 99284 EMERGENCY DEPT VISIT MOD MDM: CPT | Performed by: EMERGENCY MEDICINE

## 2024-04-13 PROCEDURE — 2500000004 HC RX 250 GENERAL PHARMACY W/ HCPCS (ALT 636 FOR OP/ED)

## 2024-04-13 PROCEDURE — 73610 X-RAY EXAM OF ANKLE: CPT | Mod: LEFT SIDE | Performed by: RADIOLOGY

## 2024-04-13 PROCEDURE — 99284 EMERGENCY DEPT VISIT MOD MDM: CPT | Mod: 25

## 2024-04-13 RX ADMIN — TETANUS TOXOID, REDUCED DIPHTHERIA TOXOID AND ACELLULAR PERTUSSIS VACCINE, ADSORBED 0.5 ML: 5; 2.5; 8; 8; 2.5 SUSPENSION INTRAMUSCULAR at 18:48

## 2024-04-13 ASSESSMENT — LIFESTYLE VARIABLES
EVER HAD A DRINK FIRST THING IN THE MORNING TO STEADY YOUR NERVES TO GET RID OF A HANGOVER: NO
HAVE YOU EVER FELT YOU SHOULD CUT DOWN ON YOUR DRINKING: NO
EVER FELT BAD OR GUILTY ABOUT YOUR DRINKING: NO
HAVE PEOPLE ANNOYED YOU BY CRITICIZING YOUR DRINKING: NO
TOTAL SCORE: 0

## 2024-04-13 ASSESSMENT — COLUMBIA-SUICIDE SEVERITY RATING SCALE - C-SSRS
2. HAVE YOU ACTUALLY HAD ANY THOUGHTS OF KILLING YOURSELF?: NO
6. HAVE YOU EVER DONE ANYTHING, STARTED TO DO ANYTHING, OR PREPARED TO DO ANYTHING TO END YOUR LIFE?: NO
1. IN THE PAST MONTH, HAVE YOU WISHED YOU WERE DEAD OR WISHED YOU COULD GO TO SLEEP AND NOT WAKE UP?: NO

## 2024-04-13 ASSESSMENT — PAIN - FUNCTIONAL ASSESSMENT: PAIN_FUNCTIONAL_ASSESSMENT: 0-10

## 2024-04-13 ASSESSMENT — PAIN SCALES - GENERAL: PAINLEVEL_OUTOF10: 3

## 2024-04-13 NOTE — ED PROVIDER NOTES
EMERGENCY DEPARTMENT ENCOUNTER      Pt Name: Karolina Vogt  MRN: [id number]  Birthdate 1965  Date of evaluation: 4/13/2024  Provider: Shaan Wilkins MD    CHIEF COMPLAINT       Chief Complaint   Patient presents with    Ankle Pain     Fell down one stair and twisted her L ankle; denies hitting head or being on blood thinners; pt ambulated w/steady gait in triage.          HISTORY OF PRESENT ILLNESS    59-year-old female presenting with a complaint of left ankle pain and swelling. She reports missing the last step of a staircase in which she twisted her left ankle earlier today. States that she was able to walk on it afterward. She also hit her right knee on the ground where she has an abrasion but did not hit her head or lose consciousness. No pain at her right knee. She is not sure when her last tetanus shot was. States she has been feeling well otherwise.    PMHx: Bipolar, DID, Anxiety, COPD          Nursing Notes were reviewed.    PAST MEDICAL HISTORY     Past Medical History:   Diagnosis Date    Chronic obstructive pulmonary disease with (acute) exacerbation (Multi) 10/12/2020    COPD exacerbation    Chronic obstructive pulmonary disease with (acute) exacerbation (Multi) 10/12/2020    COPD exacerbation    Chronic obstructive pulmonary disease with (acute) exacerbation (Multi) 10/12/2020    COPD exacerbation    Other specified postprocedural states     H/O ovarian cystectomy    Personal history of other endocrine, nutritional and metabolic disease     History of alpha-1-antitrypsin deficiency    Personal history of other specified conditions     History of dyspnea         SURGICAL HISTORY       Past Surgical History:   Procedure Laterality Date    FOOT SURGERY  10/20/2014    Foot Surgery    KNEE ARTHROSCOPY W/ DEBRIDEMENT  10/20/2014    Knee Arthroscopy (Therapeutic)         CURRENT MEDICATIONS       Previous Medications    ALBUTEROL 0.63 MG/3 ML NEBULIZER SOLUTION    Inhale 3 mL every 4 hours.    ALPRAZOLAM  (XANAX) 1 MG TABLET    Take 1 tablet (1 mg) by mouth 2 times a day.    CLOMIPRAMINE (ANAFRANIL) 50 MG CAPSULE    Take 2 capsules (100 mg) by mouth once daily at bedtime.    CYANOCOBALAMIN (VITAMIN B-12) 500 MCG TABLET    Take 1 tablet (500 mcg) by mouth once daily.    DEXLANSOPRAZOLE (DEXILANT) 60 MG DR CAPSULE    Take 1 capsule (60 mg) by mouth once daily.    DICLOFENAC SODIUM (VOLTAREN) 1 % GEL    Apply 4.5 inches (4 g) topically 4 times a day as needed (left knee pain).    HALOPERIDOL (HALDOL) 5 MG TABLET    Take 1 tablet (5 mg) by mouth once daily.    LEVOTHYROXINE (SYNTHROID, LEVOXYL) 50 MCG TABLET    Take 1 tablet (50 mcg) by mouth once daily.    PRAZOSIN (MINIPRESS) 5 MG CAPSULE        ROFLUMILAST (DALIRESP) 500 MCG TABLET    Take 1 tablet (500 mcg) by mouth once daily.    SIMVASTATIN (ZOCOR) 20 MG TABLET    Take 1 tablet (20 mg) by mouth once daily.    TRELEGY ELLIPTA 100-62.5-25 MCG BLISTER WITH DEVICE    Inhale 1 puff once daily.    TRIHEXYPHENIDYL (ARTANE) 5 MG TABLET    Take by mouth.    UBRELVY 100 MG TABLET TABLET    Take 1 tablet (100 mg) by mouth if needed (migraine).       ALLERGIES     Nsaids (non-steroidal anti-inflammatory drug), Bupropion, Caffeine, Cephalosporins, Diphenhydramine, Erythromycin, Gabapentin, Pramipexole, Tramadol, Trazodone, and Azithromycin    FAMILY HISTORY     No family history on file.       SOCIAL HISTORY       Social History     Socioeconomic History    Marital status:      Spouse name: None    Number of children: None    Years of education: None    Highest education level: None   Occupational History    None   Tobacco Use    Smoking status: Every Day     Current packs/day: 0.50     Types: Cigarettes    Smokeless tobacco: Never   Vaping Use    Vaping status: Never Used   Substance and Sexual Activity    Alcohol use: Yes     Comment: daily 1 cocktail    Drug use: Not Currently     Types: Marijuana    Sexual activity: None   Other Topics Concern    None   Social  History Narrative    None     Social Determinants of Health     Financial Resource Strain: Not on file   Food Insecurity: Not on file   Transportation Needs: Not on file   Physical Activity: Not on file   Stress: Not on file   Social Connections: Not on file   Intimate Partner Violence: Not on file   Housing Stability: Not on file       SCREENINGS                        PHYSICAL EXAM    (up to 7 for level 4, 8 or more for level 5)     ED Triage Vitals [04/13/24 1812]   Temperature Heart Rate Respirations BP   36 °C (96.8 °F) 91 16 122/75      Pulse Ox Temp Source Heart Rate Source Patient Position   94 % Temporal -- --      BP Location FiO2 (%)     -- --       Physical Exam  Vitals and nursing note reviewed.   Constitutional:       General: She is awake. She is not in acute distress.     Appearance: Normal appearance. She is well-developed.   HENT:      Head: Normocephalic and atraumatic.      Right Ear: External ear normal.      Left Ear: External ear normal.      Nose: Nose normal. No congestion or rhinorrhea.      Mouth/Throat:      Mouth: Mucous membranes are moist.      Pharynx: Oropharynx is clear. No posterior oropharyngeal erythema.   Eyes:      General: No scleral icterus.        Right eye: No discharge.         Left eye: No discharge.      Extraocular Movements: Extraocular movements intact.      Conjunctiva/sclera: Conjunctivae normal.   Cardiovascular:      Rate and Rhythm: Normal rate and regular rhythm.      Pulses: Normal pulses.      Heart sounds: Normal heart sounds. No murmur heard.     No friction rub.   Pulmonary:      Effort: Pulmonary effort is normal. No respiratory distress.      Breath sounds: Normal breath sounds. No stridor. No wheezing or rales.   Abdominal:      General: There is no distension.      Palpations: Abdomen is soft.      Tenderness: There is no abdominal tenderness. There is no right CVA tenderness, left CVA tenderness, guarding or rebound.   Musculoskeletal:         General:  No swelling.      Cervical back: Normal range of motion and neck supple.      Right lower leg: No edema.      Left lower leg: Swelling, tenderness and bony tenderness (left lateral malleolus & base of 5th metatarsal) present. No edema.      Comments: No tenderness of the proximal fibular head   Skin:     General: Skin is warm and dry.      Capillary Refill: Capillary refill takes less than 2 seconds.      Coloration: Skin is not jaundiced or pale.      Findings: Abrasion present. No lesion or rash.          Neurological:      General: No focal deficit present.      Mental Status: She is alert and oriented to person, place, and time. Mental status is at baseline.      Cranial Nerves: No cranial nerve deficit.      Sensory: No sensory deficit.      Motor: No weakness.   Psychiatric:         Mood and Affect: Mood normal.         Behavior: Behavior normal. Behavior is cooperative.         Thought Content: Thought content normal.         Judgment: Judgment normal.          DIAGNOSTIC RESULTS     LABS:  Labs Reviewed - No data to display    All other labs were within normal range or not returned as of this dictation.    Imaging  XR ankle left 3+ views    (Results Pending)   XR foot left 3+ views    (Results Pending)        Procedures  Procedures     EMERGENCY DEPARTMENT COURSE/MDM:     Diagnoses as of 04/14/24 1325   Closed fracture of distal lateral malleolus of left fibula, initial encounter        Medical Decision Making  59-year-old female presenting with complaint of a left ankle sprain after missing the last step of a staircase.  Patient is afebrile, hemodynamically stable on room air, and in no acute distress.  She also has an abrasion to her right knee with no tenderness to palpation of the knee.  Tetanus shot provided.  Patient reports driving here and does not wish for pain medicine at this time.  X-rays of the ankle and foot reveal a small avulsion fracture of the left lateral malleolus.  Patient placed in a  posterior short splint.  She remains neurovascularly intact following splinting.  She is requesting for stronger pain med prescription.  Her psychiatric history is concerning on whether to prescribe her opioids.  Naproxen tablets prescribed.  She will follow-up with orthopedic surgery in 1 week.  Strict return precautions provided.  Case and plan discussed in entirety with attending, Dr. Mercedes.        Patient and or family in agreement and understanding of treatment plan.  All questions answered.      I reviewed the case with the attending ED physician. The attending ED physician agrees with the plan. Patient and/or patient´s representative was counseled regarding labs, imaging, likely diagnosis, and plan. All questions were answered.    ED Medications administered this visit:    Medications   diphth,pertus(acell),tetanus (BoostRIX) 2.5-8-5 Lf-mcg-Lf/0.5mL vaccine 0.5 mL (has no administration in time range)       New Prescriptions from this visit:    New Prescriptions    No medications on file       Follow-up:  No follow-up provider specified.      Final Impression: No diagnosis found.      (Please note that portions of this note were completed with a voice recognition program.  Efforts were made to edit the dictations but occasionally words are mis-transcribed.)     Shaan Wilkins MD  Resident  04/14/24 [id number]       Shaan Wilkins MD  Resident  04/14/24 [id number]

## 2024-04-16 ENCOUNTER — APPOINTMENT (OUTPATIENT)
Dept: CARDIOLOGY | Facility: HOSPITAL | Age: 59
End: 2024-04-16
Payer: COMMERCIAL

## 2024-04-16 ENCOUNTER — TELEPHONE (OUTPATIENT)
Dept: ORTHOPEDIC SURGERY | Facility: CLINIC | Age: 59
End: 2024-04-16

## 2024-04-16 ENCOUNTER — HOSPITAL ENCOUNTER (EMERGENCY)
Facility: HOSPITAL | Age: 59
Discharge: HOME | End: 2024-04-16
Attending: EMERGENCY MEDICINE
Payer: COMMERCIAL

## 2024-04-16 ENCOUNTER — OFFICE VISIT (OUTPATIENT)
Dept: ORTHOPEDIC SURGERY | Facility: CLINIC | Age: 59
End: 2024-04-16
Payer: COMMERCIAL

## 2024-04-16 ENCOUNTER — APPOINTMENT (OUTPATIENT)
Dept: RADIOLOGY | Facility: HOSPITAL | Age: 59
End: 2024-04-16
Payer: COMMERCIAL

## 2024-04-16 VITALS
TEMPERATURE: 97.7 F | RESPIRATION RATE: 20 BRPM | HEIGHT: 64 IN | SYSTOLIC BLOOD PRESSURE: 109 MMHG | HEART RATE: 85 BPM | DIASTOLIC BLOOD PRESSURE: 71 MMHG | BODY MASS INDEX: 29.37 KG/M2 | WEIGHT: 172 LBS | OXYGEN SATURATION: 92 %

## 2024-04-16 DIAGNOSIS — S82.839A AVULSION FRACTURE OF DISTAL FIBULA: Primary | ICD-10-CM

## 2024-04-16 DIAGNOSIS — R07.9 CHEST PAIN, UNSPECIFIED TYPE: Primary | ICD-10-CM

## 2024-04-16 LAB
ALBUMIN SERPL BCP-MCNC: 4.1 G/DL (ref 3.4–5)
ALP SERPL-CCNC: 91 U/L (ref 33–110)
ALT SERPL W P-5'-P-CCNC: 16 U/L (ref 7–45)
ANION GAP SERPL CALC-SCNC: 11 MMOL/L (ref 10–20)
AST SERPL W P-5'-P-CCNC: 19 U/L (ref 9–39)
BASOPHILS # BLD AUTO: 0.04 X10*3/UL (ref 0–0.1)
BASOPHILS NFR BLD AUTO: 0.7 %
BILIRUB SERPL-MCNC: 0.2 MG/DL (ref 0–1.2)
BNP SERPL-MCNC: 23 PG/ML (ref 0–99)
BUN SERPL-MCNC: 10 MG/DL (ref 6–23)
CALCIUM SERPL-MCNC: 9.2 MG/DL (ref 8.6–10.3)
CARDIAC TROPONIN I PNL SERPL HS: 3 NG/L (ref 0–13)
CARDIAC TROPONIN I PNL SERPL HS: 3 NG/L (ref 0–13)
CHLORIDE SERPL-SCNC: 103 MMOL/L (ref 98–107)
CO2 SERPL-SCNC: 29 MMOL/L (ref 21–32)
CREAT SERPL-MCNC: 0.83 MG/DL (ref 0.5–1.05)
D DIMER PPP FEU-MCNC: 273 NG/ML FEU
EGFRCR SERPLBLD CKD-EPI 2021: 82 ML/MIN/1.73M*2
EOSINOPHIL # BLD AUTO: 0.09 X10*3/UL (ref 0–0.7)
EOSINOPHIL NFR BLD AUTO: 1.5 %
ERYTHROCYTE [DISTWIDTH] IN BLOOD BY AUTOMATED COUNT: 14.7 % (ref 11.5–14.5)
FLUAV RNA RESP QL NAA+PROBE: NOT DETECTED
FLUBV RNA RESP QL NAA+PROBE: NOT DETECTED
GLUCOSE SERPL-MCNC: 124 MG/DL (ref 74–99)
HCT VFR BLD AUTO: 38.2 % (ref 36–46)
HGB BLD-MCNC: 11.8 G/DL (ref 12–16)
IMM GRANULOCYTES # BLD AUTO: 0.02 X10*3/UL (ref 0–0.7)
IMM GRANULOCYTES NFR BLD AUTO: 0.3 % (ref 0–0.9)
INR PPP: 0.9 (ref 0.9–1.1)
LYMPHOCYTES # BLD AUTO: 1.64 X10*3/UL (ref 1.2–4.8)
LYMPHOCYTES NFR BLD AUTO: 26.9 %
MCH RBC QN AUTO: 27.6 PG (ref 26–34)
MCHC RBC AUTO-ENTMCNC: 30.9 G/DL (ref 32–36)
MCV RBC AUTO: 89 FL (ref 80–100)
MONOCYTES # BLD AUTO: 0.63 X10*3/UL (ref 0.1–1)
MONOCYTES NFR BLD AUTO: 10.3 %
NEUTROPHILS # BLD AUTO: 3.68 X10*3/UL (ref 1.2–7.7)
NEUTROPHILS NFR BLD AUTO: 60.3 %
NRBC BLD-RTO: 0 /100 WBCS (ref 0–0)
PLATELET # BLD AUTO: 340 X10*3/UL (ref 150–450)
POTASSIUM SERPL-SCNC: 3.6 MMOL/L (ref 3.5–5.3)
PROT SERPL-MCNC: 6.7 G/DL (ref 6.4–8.2)
PROTHROMBIN TIME: 10.1 SECONDS (ref 9.8–12.8)
RBC # BLD AUTO: 4.28 X10*6/UL (ref 4–5.2)
RSV RNA RESP QL NAA+PROBE: NOT DETECTED
SARS-COV-2 RNA RESP QL NAA+PROBE: NOT DETECTED
SODIUM SERPL-SCNC: 139 MMOL/L (ref 136–145)
WBC # BLD AUTO: 6.1 X10*3/UL (ref 4.4–11.3)

## 2024-04-16 PROCEDURE — 99283 EMERGENCY DEPT VISIT LOW MDM: CPT | Mod: 25

## 2024-04-16 PROCEDURE — 85610 PROTHROMBIN TIME: CPT | Performed by: EMERGENCY MEDICINE

## 2024-04-16 PROCEDURE — 27786 TREATMENT OF ANKLE FRACTURE: CPT | Performed by: FAMILY MEDICINE

## 2024-04-16 PROCEDURE — 84484 ASSAY OF TROPONIN QUANT: CPT | Performed by: EMERGENCY MEDICINE

## 2024-04-16 PROCEDURE — 83880 ASSAY OF NATRIURETIC PEPTIDE: CPT | Performed by: EMERGENCY MEDICINE

## 2024-04-16 PROCEDURE — 71045 X-RAY EXAM CHEST 1 VIEW: CPT

## 2024-04-16 PROCEDURE — 36415 COLL VENOUS BLD VENIPUNCTURE: CPT | Performed by: EMERGENCY MEDICINE

## 2024-04-16 PROCEDURE — 80053 COMPREHEN METABOLIC PANEL: CPT | Performed by: EMERGENCY MEDICINE

## 2024-04-16 PROCEDURE — 85379 FIBRIN DEGRADATION QUANT: CPT | Performed by: STUDENT IN AN ORGANIZED HEALTH CARE EDUCATION/TRAINING PROGRAM

## 2024-04-16 PROCEDURE — 85025 COMPLETE CBC W/AUTO DIFF WBC: CPT | Performed by: EMERGENCY MEDICINE

## 2024-04-16 PROCEDURE — 2500000002 HC RX 250 W HCPCS SELF ADMINISTERED DRUGS (ALT 637 FOR MEDICARE OP, ALT 636 FOR OP/ED): Performed by: EMERGENCY MEDICINE

## 2024-04-16 PROCEDURE — 87637 SARSCOV2&INF A&B&RSV AMP PRB: CPT | Performed by: EMERGENCY MEDICINE

## 2024-04-16 PROCEDURE — 99204 OFFICE O/P NEW MOD 45 MIN: CPT | Performed by: FAMILY MEDICINE

## 2024-04-16 PROCEDURE — 71045 X-RAY EXAM CHEST 1 VIEW: CPT | Performed by: RADIOLOGY

## 2024-04-16 PROCEDURE — 93010 ELECTROCARDIOGRAM REPORT: CPT | Performed by: EMERGENCY MEDICINE

## 2024-04-16 PROCEDURE — 2500000005 HC RX 250 GENERAL PHARMACY W/O HCPCS: Performed by: EMERGENCY MEDICINE

## 2024-04-16 PROCEDURE — L4361 PNEUMA/VAC WALK BOOT PRE OTS: HCPCS | Performed by: FAMILY MEDICINE

## 2024-04-16 PROCEDURE — 93005 ELECTROCARDIOGRAM TRACING: CPT | Mod: 59

## 2024-04-16 PROCEDURE — 99285 EMERGENCY DEPT VISIT HI MDM: CPT | Performed by: EMERGENCY MEDICINE

## 2024-04-16 PROCEDURE — 99214 OFFICE O/P EST MOD 30 MIN: CPT | Mod: 25,27 | Performed by: FAMILY MEDICINE

## 2024-04-16 RX ORDER — LIDOCAINE 560 MG/1
1 PATCH PERCUTANEOUS; TOPICAL; TRANSDERMAL ONCE
Status: DISCONTINUED | OUTPATIENT
Start: 2024-04-16 | End: 2024-04-16 | Stop reason: HOSPADM

## 2024-04-16 RX ORDER — IPRATROPIUM BROMIDE AND ALBUTEROL SULFATE 2.5; .5 MG/3ML; MG/3ML
3 SOLUTION RESPIRATORY (INHALATION) ONCE
Status: COMPLETED | OUTPATIENT
Start: 2024-04-16 | End: 2024-04-16

## 2024-04-16 RX ORDER — NITROGLYCERIN 0.4 MG/1
0.4 TABLET SUBLINGUAL ONCE
Status: DISCONTINUED | OUTPATIENT
Start: 2024-04-16 | End: 2024-04-16

## 2024-04-16 RX ORDER — HYDROCODONE BITARTRATE AND ACETAMINOPHEN 5; 325 MG/1; MG/1
1 TABLET ORAL EVERY 8 HOURS PRN
Qty: 20 TABLET | Refills: 0 | Status: SHIPPED | OUTPATIENT
Start: 2024-04-16 | End: 2024-04-23

## 2024-04-16 RX ORDER — ACETAMINOPHEN 325 MG/1
650 TABLET ORAL ONCE
Status: COMPLETED | OUTPATIENT
Start: 2024-04-16 | End: 2024-04-16

## 2024-04-16 RX ORDER — ONDANSETRON 4 MG/1
4 TABLET, FILM COATED ORAL EVERY 8 HOURS PRN
Qty: 20 TABLET | Refills: 0 | Status: SHIPPED | OUTPATIENT
Start: 2024-04-16 | End: 2024-04-23

## 2024-04-16 RX ORDER — LIDOCAINE 560 MG/1
1 PATCH PERCUTANEOUS; TOPICAL; TRANSDERMAL DAILY
Status: DISCONTINUED | OUTPATIENT
Start: 2024-04-16 | End: 2024-04-16

## 2024-04-16 RX ADMIN — IPRATROPIUM BROMIDE AND ALBUTEROL SULFATE 3 ML: 2.5; .5 SOLUTION RESPIRATORY (INHALATION) at 03:41

## 2024-04-16 RX ADMIN — LIDOCAINE 1 PATCH: 4 PATCH TOPICAL at 05:52

## 2024-04-16 RX ADMIN — ACETAMINOPHEN 650 MG: 325 TABLET ORAL at 04:21

## 2024-04-16 ASSESSMENT — PAIN DESCRIPTION - LOCATION
LOCATION: BACK
LOCATION: BACK
LOCATION: CHEST

## 2024-04-16 ASSESSMENT — PAIN DESCRIPTION - ONSET: ONSET: SUDDEN

## 2024-04-16 ASSESSMENT — PAIN SCALES - GENERAL
PAINLEVEL_OUTOF10: 5 - MODERATE PAIN
PAINLEVEL_OUTOF10: 7
PAINLEVEL_OUTOF10: 7

## 2024-04-16 ASSESSMENT — LIFESTYLE VARIABLES
TOTAL SCORE: 0
EVER HAD A DRINK FIRST THING IN THE MORNING TO STEADY YOUR NERVES TO GET RID OF A HANGOVER: NO
EVER FELT BAD OR GUILTY ABOUT YOUR DRINKING: NO
HAVE YOU EVER FELT YOU SHOULD CUT DOWN ON YOUR DRINKING: NO
HAVE PEOPLE ANNOYED YOU BY CRITICIZING YOUR DRINKING: NO

## 2024-04-16 ASSESSMENT — PAIN - FUNCTIONAL ASSESSMENT
PAIN_FUNCTIONAL_ASSESSMENT: 0-10
PAIN_FUNCTIONAL_ASSESSMENT: 0-10

## 2024-04-16 ASSESSMENT — PAIN DESCRIPTION - DESCRIPTORS: DESCRIPTORS: ACHING

## 2024-04-16 ASSESSMENT — PAIN DESCRIPTION - PAIN TYPE
TYPE: ACUTE PAIN
TYPE: ACUTE PAIN

## 2024-04-16 ASSESSMENT — PAIN DESCRIPTION - ORIENTATION
ORIENTATION: MID
ORIENTATION: INNER;MID

## 2024-04-16 ASSESSMENT — COLUMBIA-SUICIDE SEVERITY RATING SCALE - C-SSRS
2. HAVE YOU ACTUALLY HAD ANY THOUGHTS OF KILLING YOURSELF?: NO
1. IN THE PAST MONTH, HAVE YOU WISHED YOU WERE DEAD OR WISHED YOU COULD GO TO SLEEP AND NOT WAKE UP?: NO
6. HAVE YOU EVER DONE ANYTHING, STARTED TO DO ANYTHING, OR PREPARED TO DO ANYTHING TO END YOUR LIFE?: NO

## 2024-04-16 ASSESSMENT — PAIN DESCRIPTION - PROGRESSION: CLINICAL_PROGRESSION: NOT CHANGED

## 2024-04-16 ASSESSMENT — PAIN DESCRIPTION - FREQUENCY: FREQUENCY: CONSTANT/CONTINUOUS

## 2024-04-16 NOTE — LETTER
April 16, 2024     Patient: Karolina Vogt   YOB: 1965   Date of Visit: 4/16/2024       To Whom It May Concern:    It is my medical opinion that Karolina Vogt may return to work on 04/17/24 and must wear boot at all times .    If you have any questions or concerns, please don't hesitate to call.         Sincerely,        Cole C Budinsky, MD

## 2024-04-16 NOTE — LETTER
April 16, 2024     Patient: Karolina Vogt   YOB: 1965   Date of Visit: 4/16/2024     To Whom It May Concern:    It is my medical opinion that Karolina Vogt may return to work on 04/18/24 and must wear boot at all times.    If you have any questions or concerns, please don't hesitate to call.         Sincerely,        Cole C Budinsky, MD

## 2024-04-16 NOTE — ED PROVIDER NOTES
HPI   Chief Complaint   Patient presents with    Chest Pain     Midsternal x 2 hours- patient woke up with it    Shortness of Breath     Hx of severe COPD- patient states she is supposed to be on oxygen  2L NC but doesn't use it    Dizziness     Comes and goes  on and off for 2 hours- not related to moving or standing or position changes per patient       59-year-old female presenting to the emergency room for evaluation of recurrent episodes of chest pain not related to exertion when she wakes up overnight.  Denies orthopnea or paroxysmal nocturnal dyspnea.  Also endorses today sensation of shortness of breath not relieved with albuterol nebulizer at home as well as lightheadedness.  Denies any room spinning sensation, unstable gait.  Denies any head injury.  Denies any new cough, fevers.  Denies any vomiting but does endorse nausea with the episode earlier today.  States she been having recurrent near daily episodes of chest pain for the last several days.  Some have been associated with exertion.  States she has not informed her cardiology team and her most recent cardiology appointment was canceled and she has not yet been able to reschedule.  She denies any history of hypertension, hyperlipidemia, diabetes.  She does endorse former tobacco use.  She reports she supposed be on 2 L of oxygen chronically but does not wear it as she had prior facial trauma and wearing the action causes her facial pain.  States that her oxygen level recently has been running in the mid to upper 80s.  Recently had left ankle fracture but states this pain is well-controlled and has no acute concerns regarding the left leg                          [place] Coma Scale Score: 15                     Patient History   Past Medical History:   Diagnosis Date    Chronic obstructive pulmonary disease with (acute) exacerbation (Multi) 10/12/2020    COPD exacerbation    Chronic obstructive pulmonary disease with (acute) exacerbation (Multi)  10/12/2020    COPD exacerbation    Chronic obstructive pulmonary disease with (acute) exacerbation (Multi) 10/12/2020    COPD exacerbation    Other specified postprocedural states     H/O ovarian cystectomy    Personal history of other endocrine, nutritional and metabolic disease     History of alpha-1-antitrypsin deficiency    Personal history of other specified conditions     History of dyspnea     Past Surgical History:   Procedure Laterality Date    FOOT SURGERY  10/20/2014    Foot Surgery    KNEE ARTHROSCOPY W/ DEBRIDEMENT  10/20/2014    Knee Arthroscopy (Therapeutic)     No family history on file.  Social History     Tobacco Use    Smoking status: Every Day     Current packs/day: 0.50     Types: Cigarettes    Smokeless tobacco: Never   Vaping Use    Vaping status: Never Used   Substance Use Topics    Alcohol use: Yes     Comment: daily 1 cocktail    Drug use: Not Currently     Types: Marijuana       Physical Exam   ED Triage Vitals [04/16/24 0253]   Temperature Heart Rate Respirations BP   36.5 °C (97.7 °F) 89 20 117/76      Pulse Ox Temp Source Heart Rate Source Patient Position   (!) 93 % Temporal Monitor Sitting      BP Location FiO2 (%)     Right arm --       Physical Exam  Vitals and nursing note reviewed.   Constitutional:       General: She is not in acute distress.     Appearance: She is well-developed. She is not ill-appearing.   HENT:      Head: Normocephalic and atraumatic.      Nose: No congestion or rhinorrhea.      Mouth/Throat:      Mouth: Mucous membranes are moist.      Pharynx: No oropharyngeal exudate or posterior oropharyngeal erythema.   Eyes:      Conjunctiva/sclera: Conjunctivae normal.   Cardiovascular:      Rate and Rhythm: Normal rate and regular rhythm.      Pulses: Normal pulses.      Heart sounds: No murmur heard.     No gallop.   Pulmonary:      Effort: Pulmonary effort is normal. No respiratory distress.      Breath sounds: No stridor. Decreased breath sounds and wheezing  present. No rhonchi or rales.   Abdominal:      General: Bowel sounds are normal. There is no distension.      Palpations: Abdomen is soft.      Tenderness: There is no abdominal tenderness. There is no guarding or rebound.   Musculoskeletal:         General: No swelling.      Cervical back: Neck supple.      Right lower leg: No tenderness. No edema.      Left lower leg: No tenderness. No edema.      Comments: Left short leg posterior splint.  Cap refill less than 2 seconds and normal perfusion of the lower extremities.   Skin:     General: Skin is warm and dry.      Capillary Refill: Capillary refill takes less than 2 seconds.      Findings: No rash.   Neurological:      General: No focal deficit present.      Mental Status: She is alert and oriented to person, place, and time.      Cranial Nerves: No cranial nerve deficit.      Sensory: No sensory deficit.      Gait: Gait normal.   Psychiatric:         Mood and Affect: Mood normal.         Behavior: Behavior normal.         Thought Content: Thought content normal.         ED Course & [facility]   ED Course as of 04/16/24 0653   Tue Apr 16, 2024   0318 EKG performed at 02: 50 and independently reviewed by provider: Reveals NSR with a rate of 90 bpm, normal axis, prolonged QTc of 492 ms, no ST changes, no T wave abnormalities, no ectopy. No STEMI. [TL]   0400 Chest Radiograph interpretation: No acute cardiopulmonary process.  No pneumothorax, widened mediastinum, pneumonia. [TL]   0415 Patient with borderline blood pressure and will hold nitroglycerin after discussion with nursing and give 650 mg of Tylenol at this time. [TL]   0415 HEART Score:    History:   [  ] Slightly suspicious - 0   [ x ] Moderately suspicious - 1  [  ] Highly suspicious - 2     EKG:   [ x ] Normal - 0    [  ] Non-specific repolarization disturbance (No ST changes, but LBBB, LVH, repolarization changes)  - 1   [  ] Significant ST deviation (ST changes not d/t LBBB, LVH, digoxin)  - 2     Age:   [  ]  < 45 - 0   [ x ] 45-64 - 1   [  ] > 65 - 2     Risk Factors:     HTN, HLD, DM, obesity (BMI > 30), smoking (current or w/I 3mo), + fmx (before age of 65), CAD, prior MI, PCI/CABG, CVA/TIA, PAD  [  ] No known risk factors - 0   [ x ] 1-2 risk factors - 1    [  ] >3 risk factors or hx of atherosclerotic disease - 2     Initial troponin:  [ x ] < normal limit - 0   [  ] 1 - 3x normal limit - 1   [  ] > 3x normal limit - 2    Total:   [x ] 0-3 Points: 1.7% risk of major adverse cardiac event in 6 weeks  [ ] 4-6 Points: 12-16.6% risk of major adverse cardiac event in 6 weeks  [ ] 7-10 Points 50-65% risk of major adverse cardiac event in 6 weeks    I did discuss the heart score results with the patient.  We did discuss the risk stratification. I did discuss that the patient's risk based on the above scoring and their risk of coronary artery disease. The patient is comfortable being discharged home and following up with the appropriate physicians. This is shared decision making. They're to return to the nearest emergency room for any new or worsening symptoms. [TL]   0421 Mild drop in hemoglobin noted compared to 9 months ago.  Patient has no active signs of bleeding.  Advised her to follow-up with us as an outpatient.  She denies any melena or hematochezia.  BNP within normal limits and is consistent with my examination which does not indicate heart failure at this time.  COVID influenza negative as well as RSV.  Metabolic panel is unremarkable. [TL]   0540 2 negative troponins.  On repeat assessment patient is sleeping and hospital bed with no acute distress.  Return precautions explained.  Discharged stable condition with PCP follow-up. [TL]      ED Course User Index  [TL] Rancho Burks DO         Diagnoses as of 04/16/24 0653   Chest pain, unspecified type       Medical Decision Making  59-year-old female presenting for recurrent episodes of nonexertional chest pain and today came in as there was associated shortness  of breath and lightheadedness.  She also endorses mild nausea.  Not diaphoretic in no acute distress on my examination.  Per patient she has not had recent stress test or cardiac catheterization.  Risk factors for cardiac disease include tobacco use and patient has near obesity BMI of 29.5.  Patient has moderate wheezing and diminished air movement in the lung fields but no increased work of breathing or hypoxia in the emergency department.  1 DuoNeb to be given and will reassess nitroglycerin to be given at this time and will reassess..  EKG immediately obtained with no acute injury pattern or signs of ischemia.  Serial troponins to be obtained.  Given recent fracture of the lower extremity D-dimer to be obtained to evaluate for possibility of pulmonary embolism however patient has no signs of DVT in the lower extremity in the splint does not appear to be causing any acute complication at this time.  Chest x-ray to be obtained to eval for signs of pneumonia, pneumothorax or wide mediastinum suggest aortic dissection although I doubt all of these based on the history and physical examination at this time.        Procedure  Procedures     Rancho Burks DO  04/16/24 0660

## 2024-04-16 NOTE — PROGRESS NOTES
Acute Injury New Patient Visit    CC:   Chief Complaint   Patient presents with    Left Ankle - Pain     Lt ankle injury  Fell down last step 4/15/24  Xrays at [facility]       HPI: Karolina is a 59 y.o.female who presents today with new complaints of pain discomfort to the lateral side of left ankle.  She states she tripped and fell down the last step yesterday on the .  She was initially seen evaluated [facility] where there was evidence for a nondisplaced left distal fibular avulsion fracture.  She has pain discomfort to the lateral side of the ankle and across the top of the foot.  She presents here today for further evaluation.  Denies any history of prior injury or trauma to the left ankle in the past.        Review of Systems   GENERAL: Negative for malaise, significant weight loss, fever  MUSCULOSKELETAL: See HPI  NEURO: Negative for numbness / tingling     Past Medical History  Past Medical History:   Diagnosis Date    Chronic obstructive pulmonary disease with (acute) exacerbation (Multi) 10/12/2020    COPD exacerbation    Chronic obstructive pulmonary disease with (acute) exacerbation (Multi) 10/12/2020    COPD exacerbation    Chronic obstructive pulmonary disease with (acute) exacerbation (Multi) 10/12/2020    COPD exacerbation    Other specified postprocedural states     H/O ovarian cystectomy    Personal history of other endocrine, nutritional and metabolic disease     History of alpha-1-antitrypsin deficiency    Personal history of other specified conditions     History of dyspnea       Medication review  Medication Documentation Review Audit       Reviewed by Cole C Budinsky, MD (Physician) on 24 at 1209      Medication Order Taking? Sig Documenting Provider Last Dose Status   acetaminophen (Tylenol) tablet 650 mg [id number]   Avery Baker, DO   24 0421   albuterol 0.63 mg/3 mL nebulizer solution [id number] No Inhale 3 mL every 4 hours. Historical Provider, MD Taking Active    ALPRAZolam (Xanax) 1 mg tablet [id number] No Take 1 tablet (1 mg) by mouth 2 times a day. Historical Provider, MD Taking Active   clomiPRAMINE (Anafranil) 50 mg capsule [id number]  Take 2 capsules (100 mg) by mouth once daily at bedtime. Historical Provider, MD  Active   cyanocobalamin (Vitamin B-12) 500 mcg tablet [id number] No Take 1 tablet (500 mcg) by mouth once daily. Historical Provider, MD Taking Active   dexlansoprazole (Dexilant) 60 mg DR capsule [id number] No Take 1 capsule (60 mg) by mouth once daily. Jason Ayala, DO Taking Active   diclofenac sodium (Voltaren) 1 % gel [id number] No Apply 4.5 inches (4 g) topically 4 times a day as needed (left knee pain).   Patient not taking: Reported on 2024    Ni Shrestha, DO Not Taking Active   haloperidol (Haldol) 5 mg tablet [id number] No Take 1 tablet (5 mg) by mouth once daily. Historical Provider, MD Taking Active   HYDROcodone-acetaminophen (Norco) 5-325 mg tablet [id number]  Take 1 tablet by mouth every 8 hours if needed for severe pain (7 - 10) for up to 7 days. Cole C Budinsky, MD  Active   ipratropium-albuteroL (Duo-Neb) 0.5-2.5 mg/3 mL nebulizer solution 3 mL [id number]   Avery Baker, DO   24 0341   levothyroxine (Synthroid, Levoxyl) 50 mcg tablet [id number] No Take 1 tablet (50 mcg) by mouth once daily. Jason Ayala,  Taking Active      Discontinued 24 0545      Discontinued 24 0809      Discontinued 24 0420   ondansetron (Zofran) 4 mg tablet [id number]  Take 1 tablet (4 mg) by mouth every 8 hours if needed for nausea or vomiting for up to 7 days. Cole C Budinsky, MD  Active   prazosin (Minipress) 5 mg capsule [id number] No  Historical Provider, MD Taking Active   roflumilast (Daliresp) 500 mcg tablet [id number] No Take 1 tablet (500 mcg) by mouth once daily. Abigail Wagner MD Taking Active   simvastatin (Zocor) 20 mg tablet [id number] No Take 1 tablet (20 mg) by mouth once daily. Jason Ayala DO Taking  Active   Trelegy Ellipta 100-62.5-25 mcg blister with device [id number] No Inhale 1 puff once daily. Historical Provider, MD Taking Active   trihexyphenidyl (Artane) 5 mg tablet [id number] No Take by mouth. Historical Provider, MD Taking Active   Ubrelvy 100 mg tablet tablet [id number]  Take 1 tablet (100 mg) by mouth if needed (migraine). Ophelia Frederick, APRN-CNP  Active                    Allergies  Allergies   Allergen Reactions    Nsaids (Non-Steroidal Anti-Inflammatory Drug) Nausea/vomiting    Bupropion Seizure     seizures with wellbutrin    Caffeine Unknown     vomitting    Cephalosporins Nausea Only and Nausea/vomiting    Diphenhydramine Seizure    Erythromycin Nausea Only    Gabapentin Unknown    Pramipexole Unknown    Tramadol Nausea Only and Other     PT REPORTS DOES NOT MIXX WELL WITH PSYCH MEDS    Trazodone Unknown     night terrors    Azithromycin Nausea Only and Rash       Social History  Social History     Socioeconomic History    Marital status:      Spouse name: Not on file    Number of children: Not on file    Years of education: Not on file    Highest education level: Not on file   Occupational History    Not on file   Tobacco Use    Smoking status: Every Day     Current packs/day: 0.50     Types: Cigarettes    Smokeless tobacco: Never   Vaping Use    Vaping status: Never Used   Substance and Sexual Activity    Alcohol use: Yes     Comment: daily 1 cocktail    Drug use: Not Currently     Types: Marijuana    Sexual activity: Not on file   Other Topics Concern    Not on file   Social History Narrative    Not on file     Social Determinants of Health     Financial Resource Strain: Not on file   Food Insecurity: Not on file   Transportation Needs: Not on file   Physical Activity: Not on file   Stress: Not on file   Social Connections: Not on file   Intimate Partner Violence: Not on file   Housing Stability: Not on file       Surgical History  Past Surgical History:   Procedure Laterality Date     FOOT SURGERY  10/20/2014    Foot Surgery    KNEE ARTHROSCOPY W/ DEBRIDEMENT  10/20/2014    Knee Arthroscopy (Therapeutic)       Physical Exam:  GENERAL:  Patient is awake, alert, and oriented to person place and time.  Patient appears well nourished and well kept.  Affect Calm, Not Acutely Distressed.  HEENT:  Normocephalic, Atraumatic, EOMI  CARDIOVASCULAR:  Hemodynamically stable.  RESPIRATORY:  Normal respirations with unlabored breathing.  NEURO: Gross sensation intact to the lower extremities bilaterally.  Extremity: Left ankle exam: The affected ankle was examined and inspected.  There was evidence of lateral sided soft tissue swelling and fullness with mild bruising noted.  The distal fibula had mild tenderness to palpation, the distal medial malleolus was non-tender.  Tenderness across the anterior joint space and over the soft tissues in the area of the ATFL and CFL ligaments.  Negative Heel Tap and Calcaneal Squeeze, Achilles is non-tender.  Negative Tommy´s and Lewis sign.  Negative Midfoot and distal metatarsal squeeze.  Distal pulses and sensation are intact with good distal cap refill.  Active and passive ROM and strength about the ankle is limited with Dorsiflexion, Plantarflexion, Eversion, and Inversion. Unable to tolerate full weight bearing secondary to pain.      Diagnostics: Previous imaging reviewed, x-rays from the emergency department demonstrate lateral sided soft tissue swelling and a small distal fibular avulsion fracture          Procedure: None  Procedures    Assessment:   Problem List Items Addressed This Visit    None  Visit Diagnoses       Avulsion fracture of distal fibula    -  Primary    Relevant Medications    HYDROcodone-acetaminophen (Norco) 5-325 mg tablet    ondansetron (Zofran) 4 mg tablet    Other Relevant Orders    Walking Boot Tall    Ankle Brace, Lace Up or A60             Plan: Discussed the nonoperative nature of this injury with the patient and the family at the  bedside.  Will offer a tall walking boot here today.  Will pre-CERT for lace up ankle brace for follow-up.  She was provided with pain medication, OARRS was checked and okay.  She also requested some anti-nausea meds to assist with discomfort from the pain meds.  She utilize ice in the boot for activities.  She was provided with a work note to allow her to work with the boot.  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  She is to call return with any issues in the interim.  Repeat x-rays 3 views left ankle only next visit.  Orders Placed This Encounter    Walking Boot Tall    Ankle Brace, Lace Up or A60    HYDROcodone-acetaminophen (Norco) 5-325 mg tablet    ondansetron (Zofran) 4 mg tablet      At the conclusion of the visit there were no further questions by the patient/family regarding their plan of care.  Patient was instructed to call or return with any issues, questions, or concerns regarding their injury and/or treatment plan described above.     04/16/24 at 12:10 PM - Cole C Budinsky, MD    Office: [phone]    This note was prepared using voice recognition software.  The details of this note are correct and have been reviewed, and corrected to the best of my ability.  Some grammatical errors may persist related to the Dragon software.

## 2024-04-18 LAB
ATRIAL RATE: 98 BPM
P AXIS: 34 DEGREES
P OFFSET: 213 MS
P ONSET: 166 MS
PR INTERVAL: 118 MS
Q ONSET: 225 MS
QRS COUNT: 17 BEATS
QRS DURATION: 86 MS
QT INTERVAL: 386 MS
QTC CALCULATION(BAZETT): 492 MS
QTC FREDERICIA: 454 MS
R AXIS: 15 DEGREES
T AXIS: 37 DEGREES
T OFFSET: 418 MS
VENTRICULAR RATE: 98 BPM

## 2024-04-19 ENCOUNTER — APPOINTMENT (OUTPATIENT)
Dept: NEUROLOGY | Facility: CLINIC | Age: 59
End: 2024-04-19
Payer: COMMERCIAL

## 2024-04-24 ENCOUNTER — HOSPITAL ENCOUNTER (OUTPATIENT)
Dept: RADIOLOGY | Facility: CLINIC | Age: 59
Discharge: HOME | End: 2024-04-24
Payer: COMMERCIAL

## 2024-04-24 DIAGNOSIS — R93.89 ABNORMAL RADIOGRAPH: ICD-10-CM

## 2024-04-24 DIAGNOSIS — M25.462 EFFUSION OF LEFT KNEE: ICD-10-CM

## 2024-04-24 PROCEDURE — 73721 MRI JNT OF LWR EXTRE W/O DYE: CPT | Mod: LEFT SIDE | Performed by: RADIOLOGY

## 2024-04-24 PROCEDURE — 73721 MRI JNT OF LWR EXTRE W/O DYE: CPT | Mod: LT

## 2024-04-26 ENCOUNTER — TELEPHONE (OUTPATIENT)
Dept: ORTHOPEDIC SURGERY | Facility: CLINIC | Age: 59
End: 2024-04-26
Payer: COMMERCIAL

## 2024-04-26 NOTE — TELEPHONE ENCOUNTER
I called patient but no information on voicemail. Left message to call back for results.     MRI shows possible meniscal tear. If patient is still having symptoms, we can try therapy and a brace as well as anti-inflammatory medications. Depending on symptoms, surgery may be an option.    The abnormality on xray shows Osteonecrosis which is when part of the bone does not get blood supply and dies. The surrounding bone and cartilage are intact. There is not much to be done about this at this time but we should look to see if we can identify a reason to prevent in future if able.     Please schedule in person review of mri.

## 2024-04-29 ENCOUNTER — OFFICE VISIT (OUTPATIENT)
Dept: ORTHOPEDIC SURGERY | Facility: CLINIC | Age: 59
End: 2024-04-29
Payer: COMMERCIAL

## 2024-04-29 DIAGNOSIS — M87.80: Primary | ICD-10-CM

## 2024-04-29 DIAGNOSIS — R93.89 ABNORMAL RADIOGRAPH: ICD-10-CM

## 2024-04-29 PROCEDURE — 99213 OFFICE O/P EST LOW 20 MIN: CPT | Performed by: PEDIATRICS

## 2024-04-29 NOTE — PROGRESS NOTES
A report with my findings and recommendations will be sent to the Jason Ayala DO via written or electronic means when information is available    History of Present Illness:  Karolina Vogt is a 59 y.o. female here for f/up of   1. Spontaneous osteonecrosis of knee (SONK) lesion (Multi)        2. Abnormal radiograph            2024 UPDATE: no current knee pain. She did trip and has avulsion fracture of distal fibula.   She has had several months if not longer of her knee giving way but she catches herself. Usually it is not painful.  Acute onset of knee swelling which is what brought her in.     +smoker  No h/o prolonged steroids  No known trauma     Past MSK HX:  Specialty Problems    None    Medications:   Current Outpatient Medications on File Prior to Visit   Medication Sig Dispense Refill    albuterol 0.63 mg/3 mL nebulizer solution Inhale 3 mL every 4 hours.      ALPRAZolam (Xanax) 1 mg tablet Take 1 tablet (1 mg) by mouth 2 times a day.      clomiPRAMINE (Anafranil) 50 mg capsule Take 2 capsules (100 mg) by mouth once daily at bedtime.      cyanocobalamin (Vitamin B-12) 500 mcg tablet Take 1 tablet (500 mcg) by mouth once daily.      dexlansoprazole (Dexilant) 60 mg DR capsule Take 1 capsule (60 mg) by mouth once daily. 30 capsule 3    diclofenac sodium (Voltaren) 1 % gel Apply 4.5 inches (4 g) topically 4 times a day as needed (left knee pain). (Patient not taking: Reported on 2024) 100 g 1    haloperidol (Haldol) 5 mg tablet Take 1 tablet (5 mg) by mouth once daily.      [] HYDROcodone-acetaminophen (Norco) 5-325 mg tablet Take 1 tablet by mouth every 8 hours if needed for severe pain (7 - 10) for up to 7 days. 20 tablet 0    levothyroxine (Synthroid, Levoxyl) 50 mcg tablet Take 1 tablet (50 mcg) by mouth once daily. 30 tablet 2    [] ondansetron (Zofran) 4 mg tablet Take 1 tablet (4 mg) by mouth every 8 hours if needed for nausea or vomiting for up to 7 days. 20 tablet 0     prazosin (Minipress) 5 mg capsule       roflumilast (Daliresp) 500 mcg tablet Take 1 tablet (500 mcg) by mouth once daily. 30 tablet 11    simvastatin (Zocor) 20 mg tablet Take 1 tablet (20 mg) by mouth once daily. 90 tablet 1    Trelegy Ellipta 100-62.5-25 mcg blister with device Inhale 1 puff once daily.      trihexyphenidyl (Artane) 5 mg tablet Take by mouth.      Ubrelvy 100 mg tablet tablet Take 1 tablet (100 mg) by mouth if needed (migraine). 16 tablet 6     No current facility-administered medications on file prior to visit.         Allergies:    Allergies   Allergen Reactions    Nsaids (Non-Steroidal Anti-Inflammatory Drug) Nausea/vomiting    Bupropion Seizure     seizures with wellbutrin    Caffeine Unknown     vomitting    Cephalosporins Nausea Only and Nausea/vomiting    Diphenhydramine Seizure    Erythromycin Nausea Only    Gabapentin Unknown    Pramipexole Unknown    Tramadol Nausea Only and Other     PT REPORTS DOES NOT MIXX WELL WITH PSYCH MEDS    Trazodone Unknown     night terrors    Azithromycin Nausea Only and Rash        Physical Exam:  General appearance: Well-appearing well-nourished  Psych: Normal mood and affect    Altered gait due to boot on left ankle  No visible knee effusion or redness today    MRI: 1. Two serpiginous areas within the distal anterior and posterior lateral femoral condyles with extension to the subchondal bone plate. Findings are consistent with osteonecrosis with no fracture evident.  2. Possible subtle free margin radial tear of the anterior body junction of the lateral meniscus.    Impression and Plan:  Karolina Vogt is a 59 y.o. female here for f/up of   1. Spontaneous osteonecrosis of knee (SONK) lesion (Multi)        2. Abnormal radiograph           Lateral femoral condyle changes c/w osteonecrosis  Given resolved swelling and no current pain, this can be treated with as needed tylenol/advil.    PLAN/FOLLOW UP:  prn     DIagnosis, evaluation, and treatment options  were explained to patient in detail and questions answered.   A detailed handout was provided to patient with further information on diagnosis, evaluation, and treatment.   Home exercises were explained and included on the sheet.  Further treatment as discussed.    Call Pediatric Sports Medicine Office [phone] if not improving as expected or any further concern.      ** Please excuse any errors in grammar or translation related to this dictation. Voice recognition software was utilized to prepare this document. **

## 2024-05-10 ENCOUNTER — OFFICE VISIT (OUTPATIENT)
Dept: ORTHOPEDIC SURGERY | Facility: CLINIC | Age: 59
End: 2024-05-10
Payer: COMMERCIAL

## 2024-05-10 ENCOUNTER — HOSPITAL ENCOUNTER (OUTPATIENT)
Dept: RADIOLOGY | Facility: CLINIC | Age: 59
Discharge: HOME | End: 2024-05-10
Payer: COMMERCIAL

## 2024-05-10 DIAGNOSIS — S82.839A AVULSION FRACTURE OF DISTAL FIBULA: ICD-10-CM

## 2024-05-10 PROCEDURE — 73610 X-RAY EXAM OF ANKLE: CPT | Mod: LT

## 2024-05-10 PROCEDURE — L1902 AFO ANKLE GAUNTLET PRE OTS: HCPCS | Performed by: FAMILY MEDICINE

## 2024-05-10 PROCEDURE — 99024 POSTOP FOLLOW-UP VISIT: CPT | Performed by: FAMILY MEDICINE

## 2024-05-10 PROCEDURE — 73610 X-RAY EXAM OF ANKLE: CPT | Mod: LEFT SIDE | Performed by: FAMILY MEDICINE

## 2024-05-10 NOTE — PROGRESS NOTES
Established Patient Follow-Up Visit    CC: No chief complaint on file.      HPI:  Karolina is a 59 y.o. female returns here today for follow-up visit regarding: Left distal fibular avulsion fracture.  She presents her today for further evaluation.  She denies any pain or discomfort about the ankle.  She tolerated the boot well she presents here today for repeat evaluation.          REVIEW OF SYSTEMS:  GENERAL: Negative for malaise, significant weight loss, fever  MUSCULOSKELETAL: See HPI  NEURO: Negative for numbness / tingling       PHYSICAL EXAM:  -Neuro: Gross sensation intact to the lower extremities bilaterally.  -Extremity: Left ankle exam: On inspection minimal soft tissue swelling subtle tenderness palpation over the distal fibula.  She has no medial malleoli pain no anterior joint line pain she has excellent plantarflexion dorsiflexion eversion inversion.  No significant laxity with anterior drawer midfoot distal metatarsals nontender she is able stand place full weightbearing walk around the room without pain.  Little tenderness over her prior bunion surgery which is stable per patient.  Nontender.    IMAGING: Repeat x-rays today demonstrate stable appearing nondisplaced left distal fibular buckle fracture      PROCEDURE: None  Procedures     ASSESSMENT:   Follow-up visit for:  Problem List Items Addressed This Visit    None  Visit Diagnoses       Avulsion fracture of distal fibula        Relevant Orders    XR ankle left 3+ views    Ankle Brace, Lace Up or A60             PLAN: At this time offer the patient transition to a lace up ankle brace.  He was given home exercise to work on strength and range of motion recovery.  She will utilize a brace for the next 4 to 6 weeks with weightbearing activities and wean out as tolerated.  I will plan to see her back as needed as discussed in the room here today.  She will call return with any issues down the road.  Orders Placed This Encounter    Ankle Brace, Lace  Up or A60    XR ankle left 3+ views           At the conclusion of the visit there were no further questions by the patient/family regarding their plan of care.  Patient was instructed to call or return with any issues, questions, or concerns regarding their injury and/or treatment plan described above.     05/10/24 at 10:00 AM - Cole C Budinsky, MD    Office: [phone]    This note was prepared using voice recognition software.  The details of this note are correct and have been reviewed, and corrected to the best of my ability.  Some grammatical errors may persist related to the Dragon software.

## 2024-05-16 DIAGNOSIS — K21.9 GASTROESOPHAGEAL REFLUX DISEASE WITHOUT ESOPHAGITIS: ICD-10-CM

## 2024-05-16 RX ORDER — DEXLANSOPRAZOLE 60 MG/1
1 CAPSULE, DELAYED RELEASE ORAL DAILY
Qty: 30 CAPSULE | Refills: 3 | Status: SHIPPED | OUTPATIENT
Start: 2024-05-16

## 2024-05-22 ENCOUNTER — OFFICE VISIT (OUTPATIENT)
Dept: PULMONOLOGY | Facility: CLINIC | Age: 59
End: 2024-05-22
Payer: COMMERCIAL

## 2024-05-22 VITALS
WEIGHT: 170 LBS | DIASTOLIC BLOOD PRESSURE: 78 MMHG | OXYGEN SATURATION: 90 % | HEIGHT: 64 IN | BODY MASS INDEX: 29.02 KG/M2 | TEMPERATURE: 97.7 F | SYSTOLIC BLOOD PRESSURE: 125 MMHG | HEART RATE: 75 BPM

## 2024-05-22 DIAGNOSIS — J41.8 MIXED SIMPLE AND MUCOPURULENT CHRONIC BRONCHITIS (MULTI): Primary | ICD-10-CM

## 2024-05-22 DIAGNOSIS — F17.218 CIGARETTE NICOTINE DEPENDENCE WITH OTHER NICOTINE-INDUCED DISORDER: ICD-10-CM

## 2024-05-22 PROCEDURE — 99214 OFFICE O/P EST MOD 30 MIN: CPT | Performed by: INTERNAL MEDICINE

## 2024-05-22 RX ORDER — PREDNISONE 10 MG/1
TABLET ORAL DAILY
Qty: 40 EACH | Refills: 0 | Status: SHIPPED | OUTPATIENT
Start: 2024-05-22 | End: 2024-06-06

## 2024-05-22 ASSESSMENT — PATIENT HEALTH QUESTIONNAIRE - PHQ9
2. FEELING DOWN, DEPRESSED OR HOPELESS: NOT AT ALL
SUM OF ALL RESPONSES TO PHQ9 QUESTIONS 1 AND 2: 0
1. LITTLE INTEREST OR PLEASURE IN DOING THINGS: NOT AT ALL

## 2024-05-22 ASSESSMENT — PAIN SCALES - GENERAL: PAINLEVEL: 0-NO PAIN

## 2024-05-22 ASSESSMENT — COPD QUESTIONNAIRES: CAT_TOTALSCORE: 12

## 2024-05-22 ASSESSMENT — ENCOUNTER SYMPTOMS
OCCASIONAL FEELINGS OF UNSTEADINESS: 1
DEPRESSION: 1
LOSS OF SENSATION IN FEET: 0

## 2024-05-28 ENCOUNTER — LAB (OUTPATIENT)
Dept: LAB | Facility: LAB | Age: 59
End: 2024-05-28
Payer: COMMERCIAL

## 2024-05-28 ENCOUNTER — OFFICE VISIT (OUTPATIENT)
Dept: PRIMARY CARE | Facility: CLINIC | Age: 59
End: 2024-05-28
Payer: COMMERCIAL

## 2024-05-28 VITALS
WEIGHT: 175 LBS | BODY MASS INDEX: 29.88 KG/M2 | OXYGEN SATURATION: 93 % | SYSTOLIC BLOOD PRESSURE: 116 MMHG | DIASTOLIC BLOOD PRESSURE: 72 MMHG | TEMPERATURE: 98.1 F | HEART RATE: 96 BPM | HEIGHT: 64 IN | RESPIRATION RATE: 18 BRPM

## 2024-05-28 DIAGNOSIS — I47.10 PAROXYSMAL SVT (SUPRAVENTRICULAR TACHYCARDIA) (CMS-HCC): ICD-10-CM

## 2024-05-28 DIAGNOSIS — J44.9 CHRONIC OBSTRUCTIVE PULMONARY DISEASE, UNSPECIFIED COPD TYPE (MULTI): ICD-10-CM

## 2024-05-28 DIAGNOSIS — E88.01 ALPHA-1-ANTITRYPSIN DEFICIENCY (MULTI): ICD-10-CM

## 2024-05-28 DIAGNOSIS — F25.9 SCHIZOAFFECTIVE DISORDER, UNSPECIFIED TYPE (MULTI): ICD-10-CM

## 2024-05-28 DIAGNOSIS — G40.802 OTHER EPILEPSY WITHOUT STATUS EPILEPTICUS, NOT INTRACTABLE (MULTI): ICD-10-CM

## 2024-05-28 DIAGNOSIS — I73.9 PERIPHERAL ARTERIAL DISEASE (CMS-HCC): ICD-10-CM

## 2024-05-28 DIAGNOSIS — Z00.00 ROUTINE GENERAL MEDICAL EXAMINATION AT HEALTH CARE FACILITY: ICD-10-CM

## 2024-05-28 DIAGNOSIS — Z12.31 ENCOUNTER FOR SCREENING MAMMOGRAM FOR MALIGNANT NEOPLASM OF BREAST: ICD-10-CM

## 2024-05-28 DIAGNOSIS — Z00.00 ROUTINE GENERAL MEDICAL EXAMINATION AT HEALTH CARE FACILITY: Primary | ICD-10-CM

## 2024-05-28 LAB
ALBUMIN SERPL BCP-MCNC: 4.3 G/DL (ref 3.4–5)
ALP SERPL-CCNC: 88 U/L (ref 33–110)
ALT SERPL W P-5'-P-CCNC: 13 U/L (ref 7–45)
ANION GAP SERPL CALC-SCNC: 11 MMOL/L (ref 10–20)
AST SERPL W P-5'-P-CCNC: 17 U/L (ref 9–39)
BILIRUB SERPL-MCNC: 0.3 MG/DL (ref 0–1.2)
BUN SERPL-MCNC: 7 MG/DL (ref 6–23)
CALCIUM SERPL-MCNC: 9.4 MG/DL (ref 8.6–10.3)
CHLORIDE SERPL-SCNC: 104 MMOL/L (ref 98–107)
CHOLEST SERPL-MCNC: 156 MG/DL (ref 0–199)
CHOLESTEROL/HDL RATIO: 2
CO2 SERPL-SCNC: 30 MMOL/L (ref 21–32)
CREAT SERPL-MCNC: 0.78 MG/DL (ref 0.5–1.05)
EGFRCR SERPLBLD CKD-EPI 2021: 88 ML/MIN/1.73M*2
ERYTHROCYTE [DISTWIDTH] IN BLOOD BY AUTOMATED COUNT: 16 % (ref 11.5–14.5)
GLUCOSE SERPL-MCNC: 90 MG/DL (ref 74–99)
HCT VFR BLD AUTO: 38.9 % (ref 36–46)
HDLC SERPL-MCNC: 77 MG/DL
HGB BLD-MCNC: 12 G/DL (ref 12–16)
LDLC SERPL CALC-MCNC: 57 MG/DL
MCH RBC QN AUTO: 28.2 PG (ref 26–34)
MCHC RBC AUTO-ENTMCNC: 30.8 G/DL (ref 32–36)
MCV RBC AUTO: 92 FL (ref 80–100)
NON HDL CHOLESTEROL: 79 MG/DL (ref 0–149)
NRBC BLD-RTO: 0 /100 WBCS (ref 0–0)
PLATELET # BLD AUTO: 355 X10*3/UL (ref 150–450)
POTASSIUM SERPL-SCNC: 4.7 MMOL/L (ref 3.5–5.3)
PROT SERPL-MCNC: 6.8 G/DL (ref 6.4–8.2)
RBC # BLD AUTO: 4.25 X10*6/UL (ref 4–5.2)
SODIUM SERPL-SCNC: 140 MMOL/L (ref 136–145)
TRIGL SERPL-MCNC: 109 MG/DL (ref 0–149)
TSH SERPL-ACNC: 2.36 MIU/L (ref 0.44–3.98)
VLDL: 22 MG/DL (ref 0–40)
WBC # BLD AUTO: 7.5 X10*3/UL (ref 4.4–11.3)

## 2024-05-28 PROCEDURE — 85027 COMPLETE CBC AUTOMATED: CPT

## 2024-05-28 PROCEDURE — 84443 ASSAY THYROID STIM HORMONE: CPT

## 2024-05-28 PROCEDURE — 80061 LIPID PANEL: CPT

## 2024-05-28 PROCEDURE — 80053 COMPREHEN METABOLIC PANEL: CPT

## 2024-05-28 PROCEDURE — G0439 PPPS, SUBSEQ VISIT: HCPCS | Performed by: FAMILY MEDICINE

## 2024-05-28 PROCEDURE — 36415 COLL VENOUS BLD VENIPUNCTURE: CPT

## 2024-05-28 ASSESSMENT — PATIENT HEALTH QUESTIONNAIRE - PHQ9
SUM OF ALL RESPONSES TO PHQ9 QUESTIONS 1 AND 2: 0
1. LITTLE INTEREST OR PLEASURE IN DOING THINGS: NOT AT ALL
2. FEELING DOWN, DEPRESSED OR HOPELESS: NOT AT ALL

## 2024-05-28 ASSESSMENT — ENCOUNTER SYMPTOMS
FEVER: 0
NUMBNESS: 0
DEPRESSION: 1
WEAKNESS: 0
LOSS OF SENSATION IN FEET: 0
CONSTIPATION: 0
SHORTNESS OF BREATH: 0
OCCASIONAL FEELINGS OF UNSTEADINESS: 1
ARTHRALGIAS: 1
COUGH: 0
ABDOMINAL PAIN: 0
DIZZINESS: 0
HEADACHES: 0
DIARRHEA: 0
VOMITING: 0
WHEEZING: 1
JOINT SWELLING: 1

## 2024-05-29 NOTE — RESULT ENCOUNTER NOTE
Can we let the patient know her white blood cell count is normal, she has no anemia  Patient's glucose, liver and kidney function appear fine  Patient's total cholesterol is 156 this is good continue the medication  Patient's thyroid is normal

## 2024-06-02 NOTE — PROGRESS NOTES
Department of Medicine  Division of Pulmonary, Critical Care, and Sleep Medicine  Follow-Up Visit  [facility] - [address], [address]    Physician HPI:  Ms. Vogt is a 59-year-old female with past medical history of COPD who presented to the office today to follow-up. She is to follow-up with Dr. Boyd in the past. The patient has more than 30-pack-year history of cigarette smoking. She is a current smoker of less than than half a pack per day now. She reports increased chest congestion and cough over the past few weeks. Her symptoms were associated with increased shortness of breath at home and intermittent lightheadedness. No syncope reported. Upon presentation to the clinic today her O2 saturation was noted to be 82% on room air. She did not look to be in acute respiratory distress. She has expiratory wheezes with frequent cough during exam. Supplemental oxygen was applied and her O2 saturation improved to 92% on 3 liters per minute nasal cannula. The patient has no supplemental oxygen at home before and has not had any recent admission due to COPD exacerbation. Given her acute hypoxemic respiratory failure and possible acute respiratory infection/COPD exacerbation, I recommended to go to the ER for further assessment and treatment. I will follow-up with her shortly after discharge.     Follow up 1/20/2020:  Karolina reports to feel better since her recent discharge from the hospital. She was admitted with COPD exacerbation and acute hypoxemia. CXR did not reveal acute air-space disease. She was treated with systemic corticosteroids. She denies recurrent fevers or purulent sputum production. She has been using Albuterol as needed. She does not have a nebulizer machine at home.      Follow up 2/19/2020:  Karolina reports to be doing well now from the respiratory standpoint. She feels Trelegy has been helpful. She has to use Albuterol about once or twice per day. She denies recurrent fevers or purulent sputum now. No  acute chest pain. No orthopnea or LE edema. No recent syncopes.      Follow up 03/19/2020;  Karolina reports to feel better with Trelegy. SHe has cut down on cigarette smoking but has not stopped smoking yet. No acute cough or fevers today. She has not done PFTs yet.      Follow up 6/17/2020:  No acute respiratory symptoms today. Feels better with Trelegy. Still smoking 1/2 PPD.      Follow up 10/12/2020:  Karolina reports overall stable respiratory status since last visit. She has occasional wheezing. She is using trilogy every day. Unfortunately she continues to smoke about half a pack of cigarettes every day. No interval history of COPD exacerbation reported.     Follow-up 6/16/2022:  Karolina presented to the office today to follow-up after recent hospital admission due to acute copd exacerbation. She is feeling better since discharge though she has not started Trelegy inhaler yet as she ran out. She has been only using albuterol as needed. She admitted to continue to smoke about half a pack of cigarettes per day. No acute fevers since discharge or purulent sputum.     Follow-up 3/13/2023:  Karolina was recently started on a course of prednisone and antibiotic for acute bronchitis and COPD exacerbation. She reports to feel slightly better though she continues to have cough and wheezing with minimal exertion at home. She has been using supplemental oxygen as needed. She reports her O2 saturation would drop to 84% with activities on room air. Denies acute fevers or hemoptysis. No acute chest pain. She continues to smoke about half a pack of cigarettes every day.     Follow up 05/22/2024:  Karolina presented to the office today for follow up. Denies acute respiratory symptoms today. Using Trelegy every day. No recent hx of acute COPD exacerbation. Continue to smoker cigarettes daily (< 1/2 ppd).     Immunization History   Administered Date(s) Administered    Hep A / Hep B 11/01/2023    Pfizer COVID-19 vaccine, bivalent, age  "12 years and older (30 mcg/0.3 mL) 09/22/2022    Pfizer Purple Cap SARS-CoV-2 03/25/2021, 04/13/2021, 11/27/2021    Tdap vaccine, age 7 year and older (BOOSTRIX, ADACEL) 04/13/2024       Current Outpatient Medications   Medication Instructions    albuterol 0.63 mg/3 mL nebulizer solution 3 mL, inhalation, Every 4 hours RT    ALPRAZolam (XANAX) 1 mg, oral, 2 times daily    clomiPRAMINE (ANAFRANIL) 100 mg, oral, Nightly    cyanocobalamin (Vitamin B-12) 500 mcg tablet 1 tablet, oral, Daily    dexlansoprazole (DEXILANT) 60 mg, oral, Daily    diclofenac sodium (VOLTAREN) 4 g, Topical, 4 times daily PRN    haloperidol (HALDOL) 5 mg, oral, Daily    levothyroxine (SYNTHROID, LEVOXYL) 50 mcg, oral, Daily    prazosin (Minipress) 5 mg capsule     predniSONE 10 mg tablets,dose pack Take 40 mg by mouth early in the morning. for 4 days, THEN 30 mg early in the morning. for 4 days, THEN 20 mg early in the morning. for 4 days, THEN 10 mg early in the morning. for 4 days.    roflumilast (DALIRESP) 500 mcg, oral, Daily    simvastatin (ZOCOR) 20 mg, oral, Daily    Trelegy Ellipta 100-62.5-25 mcg blister with device 1 puff, inhalation, Daily    trihexyphenidyl (Artane) 5 mg tablet oral    Ubrelvy 100 mg, oral, As needed        Allergies:  Allergies   Allergen Reactions    Nsaids (Non-Steroidal Anti-Inflammatory Drug) Nausea/vomiting    Bupropion Seizure     seizures with wellbutrin    Caffeine Unknown     vomitting    Cephalosporins Nausea Only and Nausea/vomiting    Diphenhydramine Seizure    Erythromycin Nausea Only    Gabapentin Unknown    Pramipexole Unknown    Tramadol Nausea Only and Other     PT REPORTS DOES NOT MIXX WELL WITH PSYCH MEDS    Trazodone Unknown     night terrors    Azithromycin Nausea Only and Rash          Visit Vitals  /78   Pulse 75   Temp 36.5 °C (97.7 °F) (Temporal)   Ht 1.626 m (5' 4\")   Wt 77.1 kg (170 lb) Comment: per patient  verbal   SpO2 90%   BMI 29.18 kg/m²   OB Status Postmenopausal   Smoking " Status Every Day   BSA 1.87 m²        Physical Exam  Constitutional: General appearance: Abnormal.  chronically ill and within normal limits of ideal weight.   Pulmonary: Chest: Normal to inspection.  Respiratory effort: No increased work of breathing or signs of respiratory distress.  Auscultation of lungs: Abnormal.  Expiratory wheezes bilaterally.   Cardiovascular: Heart rate and rhythm were normal, normal S1 and S2, no gallops, no murmurs and no pericardial rub. No peripheral edema.   Musculoskeletal: No joint swelling seen, normal movements of all extremities.   Skin: Normal skin color and pigmentation, normal skin turgor, and no rash.   Psychiatric: Judgment and insight: Intact. Alert and oriented to person, place and time. Mood and affect: Normal.        Chest Radiograph     XR chest 1 view 04/16/2024    Narrative  Interpreted By:  Larry Marshall,  STUDY:  XR CHEST 1 VIEW;  4/16/2024 3:32 am    INDICATION:  Signs/Symptoms:Chest Pain.    COMPARISON:  12/2022    ACCESSION NUMBER(S):  [id number]    ORDERING CLINICIAN:  ABDULLAHI JURADO    FINDINGS:          CARDIOMEDIASTINAL SILHOUETTE:  Cardiomediastinal silhouette is normal in size and configuration.    LUNGS:  Lungs are clear.    ABDOMEN:  No remarkable upper abdominal findings.    BONES:  No acute osseous changes.    Impression  1.  No evidence of acute cardiopulmonary process.  No developing  infiltrate        MACRO:  None    Signed by: Larry Marshall 4/16/2024 3:44 AM  Dictation workstation:   [id number]      XR chest 2 view 12/14/2022    Narrative  MRN: [id number]  Patient Name: MICHAEL FALL    STUDY:  TH CHEST 2 VIEW PA AND LAT;    INDICATION:  shortness of breath  J44.1: COPD exacerbation.    COMPARISON:  Chest radiograph 11/14/2022 and CT chest 05/15/2022.    ACCESSION NUMBER(S):  [id number]    ORDERING CLINICIAN:  STEVEN VIEAR    FINDINGS:  The cardiac silhouette size is within normal limits.  There is no focal consolidation, edema or  pneumothorax.  No sizeable pleural effusion.    Impression  1. No acute cardiopulmonary process.      Echocardiogram     No results found for this or any previous visit from the past 365 days.       Chest CT Scan     CT CHEST PULMONARY EMBOLISM W IV CONTRAST 05/15/2022    Narrative  MRN: [id number]  Patient Name: MICHAEL FALL    STUDY:  CT ANGIO CHEST FOR PE;  5/15/2022 1:09 pm    INDICATION:  SOB .    COMPARISON:  None    ACCESSION NUMBER(S):  [id number]    ORDERING CLINICIAN:  ELIAZAR TRAN    TECHNIQUE:  Helical data acquisition of the chest was obtained after intravenous  administration of 30 milliliterISOVUE 370, as per PE protocol. Images  were reformatted in coronal and sagittal planes. Axial and coronal  maximum intensity projection (MIP) images were created and reviewed.    FINDINGS:  Bronchial thickening detected. Bolus timing limits sensitivity. No  evidence of central pulmonary embolism. No thoracic aneurysm or  dissection. No pericardial effusion. No pneumonia or evidence of  pulmonary edema. Mild fatty infiltration of the liver. No suspicious  osseous lesions    Impression  1. Bolus timing limits sensitivity. No evidence of central pulmonary  embolism. Distal vessels not well interrogated bronchial thickening  detected. No features of pneumonia or pulmonary edema       Laboratory Studies     Lab Results   Component Value Date    WBC 7.5 05/28/2024    HGB 12.0 05/28/2024    HCT 38.9 05/28/2024    MCV 92 05/28/2024     05/28/2024      Lab Results   Component Value Date    GLUCOSE 90 05/28/2024    CALCIUM 9.4 05/28/2024     05/28/2024    K 4.7 05/28/2024    CO2 30 05/28/2024     05/28/2024    BUN 7 05/28/2024    CREATININE 0.78 05/28/2024      Lab Results   Component Value Date    ALT 13 05/28/2024    AST 17 05/28/2024    ALKPHOS 88 05/28/2024    BILITOT 0.3 05/28/2024        Protime   Date/Time Value Ref Range Status   04/16/2024 03:16 AM 10.1 9.8 - 12.8 seconds Final     INR   Date/Time  "Value Ref Range Status   04/16/2024 03:16 AM 0.9 0.9 - 1.1 Final       No results found for: \"ICIGE\", \"IGE\", \"ICA04\", \"ASPFU\", \"IGG\", \"IGA\", \"IGM\"      Pulmonary Function Test             Assessment and Plan / Recommendations     1. COPD GOLD III w/ MMRCII   2. Nicotine dependence     I reviewed her most recent CT scan of chest (July 2023). No suspicious nodules seen at that time.      Recommend:  1. Continue on ICS/LABA/LAMA  2. Continue on Roflumilast  3. Use Albuterol/Ipratropium every 4 hours as needed.   4. Smoking cessation counselling is done again today. She does not feel quite ready yet to quit.   5. Use supplemental o2 at night time and as needed during the day to keep PSO2 > 90%.      RTC in 6 months to follow up.     Please excuse any misspellings or unintended errors related to the Dragon speech recognition software used to dictate this note.     Abigail Wagner MD  05/22/2024  "

## 2024-06-03 ENCOUNTER — PROCEDURE VISIT (OUTPATIENT)
Dept: PRIMARY CARE | Facility: CLINIC | Age: 59
End: 2024-06-03
Payer: COMMERCIAL

## 2024-06-03 VITALS
HEIGHT: 63 IN | OXYGEN SATURATION: 88 % | SYSTOLIC BLOOD PRESSURE: 129 MMHG | BODY MASS INDEX: 30.83 KG/M2 | HEART RATE: 105 BPM | RESPIRATION RATE: 20 BRPM | WEIGHT: 174 LBS | DIASTOLIC BLOOD PRESSURE: 85 MMHG | TEMPERATURE: 98.2 F

## 2024-06-03 DIAGNOSIS — Z12.4 CERVICAL CANCER SCREENING: Primary | ICD-10-CM

## 2024-06-03 PROBLEM — M95.0 NASAL DEFORMITY: Status: RESOLVED | Noted: 2023-04-19 | Resolved: 2024-06-03

## 2024-06-03 PROBLEM — M25.562 BILATERAL KNEE PAIN: Status: RESOLVED | Noted: 2023-04-19 | Resolved: 2024-06-03

## 2024-06-03 PROBLEM — M21.40 PES PLANUS: Status: RESOLVED | Noted: 2023-04-19 | Resolved: 2024-06-03

## 2024-06-03 PROBLEM — J32.9 SINUSITIS: Status: RESOLVED | Noted: 2023-04-19 | Resolved: 2024-06-03

## 2024-06-03 PROBLEM — R19.5 DARK STOOLS: Status: RESOLVED | Noted: 2023-04-19 | Resolved: 2024-06-03

## 2024-06-03 PROBLEM — M19.90 OSTEOARTHRITIS: Status: RESOLVED | Noted: 2023-04-19 | Resolved: 2024-06-03

## 2024-06-03 PROBLEM — J34.89 NASAL OBSTRUCTION: Status: RESOLVED | Noted: 2023-04-19 | Resolved: 2024-06-03

## 2024-06-03 PROBLEM — R06.09 EXERTIONAL DYSPNEA: Status: RESOLVED | Noted: 2023-04-19 | Resolved: 2024-06-03

## 2024-06-03 PROBLEM — J34.89 MASS OF NASAL SINUS: Status: RESOLVED | Noted: 2023-04-19 | Resolved: 2024-06-03

## 2024-06-03 PROBLEM — R51.9 FACIAL PAIN: Status: RESOLVED | Noted: 2023-04-19 | Resolved: 2024-06-03

## 2024-06-03 PROBLEM — M25.50 ARTHRALGIA: Status: RESOLVED | Noted: 2023-04-19 | Resolved: 2024-06-03

## 2024-06-03 PROBLEM — R60.0 BILATERAL EDEMA OF LOWER EXTREMITY: Status: RESOLVED | Noted: 2023-04-19 | Resolved: 2024-06-03

## 2024-06-03 PROBLEM — R06.00 DYSPNEA: Status: RESOLVED | Noted: 2023-04-19 | Resolved: 2024-06-03

## 2024-06-03 PROBLEM — R09.02 HYPOXIA: Status: RESOLVED | Noted: 2023-04-19 | Resolved: 2024-06-03

## 2024-06-03 PROBLEM — M25.561 BILATERAL KNEE PAIN: Status: RESOLVED | Noted: 2023-04-19 | Resolved: 2024-06-03

## 2024-06-03 PROBLEM — R49.0 HOARSENESS: Status: RESOLVED | Noted: 2023-04-19 | Resolved: 2024-06-03

## 2024-06-03 PROBLEM — R61 SWEATING INCREASE: Status: RESOLVED | Noted: 2023-04-19 | Resolved: 2024-06-03

## 2024-06-03 PROBLEM — M76.829 POSTERIOR TIBIAL TENDON DYSFUNCTION: Status: RESOLVED | Noted: 2023-04-19 | Resolved: 2024-06-03

## 2024-06-03 PROBLEM — L03.116 CELLULITIS OF FOOT, LEFT: Status: RESOLVED | Noted: 2023-04-19 | Resolved: 2024-06-03

## 2024-06-03 PROBLEM — R43.9 TASTE DISORDER: Status: RESOLVED | Noted: 2023-04-19 | Resolved: 2024-06-03

## 2024-06-03 PROBLEM — M79.673 FOOT PAIN: Status: RESOLVED | Noted: 2023-04-19 | Resolved: 2024-06-03

## 2024-06-03 PROBLEM — R09.81 NASAL CONGESTION WITH RHINORRHEA: Status: RESOLVED | Noted: 2023-04-19 | Resolved: 2024-06-03

## 2024-06-03 PROBLEM — R07.81 RIB PAIN ON LEFT SIDE: Status: RESOLVED | Noted: 2023-04-19 | Resolved: 2024-06-03

## 2024-06-03 PROBLEM — R43.8 DECREASED SENSE OF SMELL: Status: RESOLVED | Noted: 2023-04-19 | Resolved: 2024-06-03

## 2024-06-03 PROBLEM — L74.9 SWEATING ABNORMALITY: Status: RESOLVED | Noted: 2023-04-19 | Resolved: 2024-06-03

## 2024-06-03 PROBLEM — R44.8 FACIAL PRESSURE: Status: RESOLVED | Noted: 2023-04-19 | Resolved: 2024-06-03

## 2024-06-03 PROBLEM — M26.609 TMJ DYSFUNCTION: Status: RESOLVED | Noted: 2024-01-26 | Resolved: 2024-06-03

## 2024-06-03 PROBLEM — M79.89 LEG SWELLING: Status: RESOLVED | Noted: 2023-04-19 | Resolved: 2024-06-03

## 2024-06-03 PROBLEM — J34.2 DEVIATED NASAL SEPTUM: Status: RESOLVED | Noted: 2023-04-19 | Resolved: 2024-06-03

## 2024-06-03 PROBLEM — B37.0 OROPHARYNGEAL CANDIDIASIS: Status: RESOLVED | Noted: 2023-04-19 | Resolved: 2024-06-03

## 2024-06-03 PROBLEM — M25.561 RIGHT KNEE PAIN: Status: RESOLVED | Noted: 2023-04-19 | Resolved: 2024-06-03

## 2024-06-03 PROBLEM — J34.89 NASAL DRYNESS: Status: RESOLVED | Noted: 2023-04-19 | Resolved: 2024-06-03

## 2024-06-03 PROBLEM — J44.1 COPD EXACERBATION (MULTI): Status: RESOLVED | Noted: 2023-04-19 | Resolved: 2024-06-03

## 2024-06-03 PROBLEM — R11.2 NAUSEA WITH VOMITING: Status: RESOLVED | Noted: 2023-04-19 | Resolved: 2024-06-03

## 2024-06-03 PROBLEM — R10.9 BILATERAL FLANK PAIN: Status: RESOLVED | Noted: 2023-04-19 | Resolved: 2024-06-03

## 2024-06-03 PROBLEM — R42 DIZZINESS: Status: RESOLVED | Noted: 2023-04-19 | Resolved: 2024-06-03

## 2024-06-03 PROBLEM — M25.60 JOINT STIFFNESS: Status: RESOLVED | Noted: 2024-01-26 | Resolved: 2024-06-03

## 2024-06-03 PROBLEM — Z86.010 HISTORY OF COLONIC POLYPS: Status: RESOLVED | Noted: 2023-04-19 | Resolved: 2024-06-03

## 2024-06-03 PROBLEM — L02.612 FOOT ABSCESS, LEFT: Status: RESOLVED | Noted: 2023-04-19 | Resolved: 2024-06-03

## 2024-06-03 PROBLEM — R41.3 POOR MEMORY: Status: RESOLVED | Noted: 2023-04-19 | Resolved: 2024-06-03

## 2024-06-03 PROBLEM — R30.0 DYSURIA: Status: RESOLVED | Noted: 2023-04-19 | Resolved: 2024-06-03

## 2024-06-03 PROBLEM — R60.9 PERIPHERAL EDEMA: Status: RESOLVED | Noted: 2023-04-19 | Resolved: 2024-06-03

## 2024-06-03 PROBLEM — J34.89 NASAL CONGESTION WITH RHINORRHEA: Status: RESOLVED | Noted: 2023-04-19 | Resolved: 2024-06-03

## 2024-06-03 PROBLEM — R60.0 PERIPHERAL EDEMA: Status: RESOLVED | Noted: 2023-04-19 | Resolved: 2024-06-03

## 2024-06-03 PROBLEM — R00.2 PALPITATIONS: Status: RESOLVED | Noted: 2023-04-19 | Resolved: 2024-06-03

## 2024-06-03 PROBLEM — Z86.0100 HISTORY OF COLONIC POLYPS: Status: RESOLVED | Noted: 2023-04-19 | Resolved: 2024-06-03

## 2024-06-03 PROBLEM — R07.9 CHEST PAIN: Status: RESOLVED | Noted: 2023-04-19 | Resolved: 2024-06-03

## 2024-06-03 PROBLEM — D64.9 ANEMIA: Status: RESOLVED | Noted: 2023-04-19 | Resolved: 2024-06-03

## 2024-06-03 PROCEDURE — 87624 HPV HI-RISK TYP POOLED RSLT: CPT

## 2024-06-03 NOTE — PROGRESS NOTES
I reviewed the resident/fellow's documentation and discussed the patient with the resident. I agree with the resident medical decision making as documented in the note.   Patient consented for the gynecological exam.  Chaperone was present  Patient aware if any chest pain shortness of breath any nausea vomiting diarrhea fever headache any concerning symptoms go to ER  Follow-up in 1 month  Agree with assessment plan  Jason Ayala, DO

## 2024-06-03 NOTE — PROGRESS NOTES
"Subjective   Karolina Vogt is a 59 y.o. female who presents for Gynecologic Exam (Pap only LMP 14 years ago).  Patient recently had her annual physical with Dr. Ayala and she presents today for a Pap smear.  Her last one was 8 years ago however the sample was nondiagnostic.  She hit menopause at age 45 so about 14 years ago.  She denies any abnormal uterine bleeding since then.  Denies any other symptoms.  She denies any known history of abnormal Paps.    Objective   /85 (BP Location: Right arm, Patient Position: Sitting)   Pulse 105   Temp 36.8 °C (98.2 °F)   Resp 20   Ht 1.6 m (5' 3\")   Wt 78.9 kg (174 lb)   LMP 01/01/2010   SpO2 (!) 88%   BMI 30.82 kg/m²    PHYSICAL EXAM  Gen: Well appearing, in NAD  Eyes: EOMI  HEENT: MMM  Lungs: No increased work of breathing, on RA  Pelvic: Normal pelvic exam, Pap smear performed  Extremities: WWP, cap refill <2sec, no pitting edema in LE b/l  Neuro: Alert, symmetrical facies, moves all extremities equally  Psych: Anxious    Assessment/Plan     Problem List Items Addressed This Visit       Cervical cancer screening - Primary    Relevant Orders    THINPREP PAP TEST      Oliva Rosales DO  Family Medicine Resident, PGY-3  [facility]  [address] [place] [address] [phone]  "

## 2024-06-06 ENCOUNTER — OFFICE VISIT (OUTPATIENT)
Dept: PRIMARY CARE | Facility: CLINIC | Age: 59
End: 2024-06-06
Payer: COMMERCIAL

## 2024-06-06 VITALS
RESPIRATION RATE: 24 BRPM | SYSTOLIC BLOOD PRESSURE: 126 MMHG | HEART RATE: 102 BPM | OXYGEN SATURATION: 89 % | WEIGHT: 175 LBS | TEMPERATURE: 97.9 F | BODY MASS INDEX: 31 KG/M2 | DIASTOLIC BLOOD PRESSURE: 84 MMHG

## 2024-06-06 DIAGNOSIS — N30.01 ACUTE CYSTITIS WITH HEMATURIA: ICD-10-CM

## 2024-06-06 DIAGNOSIS — F17.200 CURRENT EVERY DAY SMOKER: ICD-10-CM

## 2024-06-06 DIAGNOSIS — R30.0 DYSURIA: Primary | ICD-10-CM

## 2024-06-06 LAB
POC APPEARANCE, URINE: ABNORMAL
POC BILIRUBIN, URINE: NEGATIVE
POC BLOOD, URINE: ABNORMAL
POC COLOR, URINE: YELLOW
POC GLUCOSE, URINE: NEGATIVE MG/DL
POC KETONES, URINE: NEGATIVE MG/DL
POC LEUKOCYTES, URINE: ABNORMAL
POC NITRITE,URINE: POSITIVE
POC PH, URINE: 7 PH
POC PROTEIN, URINE: NEGATIVE MG/DL
POC SPECIFIC GRAVITY, URINE: 1.02
POC UROBILINOGEN, URINE: 0.2 EU/DL

## 2024-06-06 PROCEDURE — 99214 OFFICE O/P EST MOD 30 MIN: CPT

## 2024-06-06 PROCEDURE — 81002 URINALYSIS NONAUTO W/O SCOPE: CPT

## 2024-06-06 PROCEDURE — 87186 SC STD MICRODIL/AGAR DIL: CPT

## 2024-06-06 PROCEDURE — 81001 URINALYSIS AUTO W/SCOPE: CPT

## 2024-06-06 PROCEDURE — 87086 URINE CULTURE/COLONY COUNT: CPT

## 2024-06-06 RX ORDER — SULFAMETHOXAZOLE AND TRIMETHOPRIM 800; 160 MG/1; MG/1
1 TABLET ORAL 2 TIMES DAILY
Qty: 6 TABLET | Refills: 0 | Status: SHIPPED | OUTPATIENT
Start: 2024-06-06 | End: 2024-06-09

## 2024-06-06 ASSESSMENT — ENCOUNTER SYMPTOMS
ABDOMINAL PAIN: 0
FREQUENCY: 0
BACK PAIN: 0
DIFFICULTY URINATING: 0
HEMATURIA: 0
FLANK PAIN: 1
DYSURIA: 0

## 2024-06-06 NOTE — PROGRESS NOTES
I reviewed the resident/fellow's documentation and discussed the patient with the resident/fellow. I agree with the resident/fellow's medical decision making as documented in the note.     Carmen Schwarz MD

## 2024-06-06 NOTE — PROGRESS NOTES
"Subjective   Karolina Vogt is a 59 y.o. female who presents for Flank Pain (Right flank pain x 1 day. ).    MILENA Turcios is here for 1 day of left flank pain starting yesterday. Pain became gradually worse over the day. She is having stabbing left back pain that is intermittent in nature. Does not radiate.  Drinking lots of liquids at home.   She has not tried any pain medication.  Has not had kidney stone before.    + UOP, normal, no blood.  Denies frequency, urgency, dysuria, odor or discharge.  Denies fever, chills.    Last UTI was \"years\" ago.  She is not sexually active. Denies scented soaps, lotions, detergents.    She is a heavy smoker with alpha 1 antitrypsin deficiency, attempting to cut down on smoking.    Review of Systems   Gastrointestinal:  Negative for abdominal pain.   Genitourinary:  Positive for flank pain. Negative for decreased urine volume, difficulty urinating, dysuria, frequency, hematuria and vaginal discharge.   Musculoskeletal:  Negative for back pain.     Objective     /84 (BP Location: Right arm, Patient Position: Sitting)   Pulse 102   Temp 36.6 °C (97.9 °F)   Resp 24   Wt 79.4 kg (175 lb)   LMP 01/01/2010   SpO2 (!) 89%   BMI 31.00 kg/m²     Physical Exam  Vitals reviewed.   Constitutional:       General: She is not in acute distress.     Appearance: She is obese.   HENT:      Head: Normocephalic.   Cardiovascular:      Rate and Rhythm: Normal rate and regular rhythm.      Pulses: Normal pulses.      Heart sounds: Normal heart sounds. No murmur heard.  Pulmonary:      Effort: Pulmonary effort is normal.      Breath sounds: No rales.      Comments: Course rhonchi that clears with cough.   Expiratory wheezing throughout.  Abdominal:      General: Abdomen is flat. There is no distension.      Tenderness: There is no abdominal tenderness. There is no right CVA tenderness, left CVA tenderness, guarding or rebound.   Skin:     General: Skin is warm and dry.   Neurological:      " Mental Status: She is alert.   Psychiatric:         Mood and Affect: Mood normal.       Results for orders placed or performed in visit on 06/06/24 (from the past 24 hour(s))   POCT UA (nonautomated) manually resulted   Result Value Ref Range    POC Color, Urine Yellow Straw, Yellow, Light-Yellow    POC Appearance, Urine Cloudy (A) Clear    POC Glucose, Urine NEGATIVE NEGATIVE mg/dl    POC Bilirubin, Urine NEGATIVE NEGATIVE    POC Ketones, Urine NEGATIVE NEGATIVE mg/dl    POC Specific Gravity, Urine 1.020 1.005 - 1.035    POC Blood, Urine SMALL (1+) (A) NEGATIVE    POC PH, Urine 7.0 No Reference Range Established PH    POC Protein, Urine NEGATIVE NEGATIVE, 30 (1+) mg/dl    POC Urobilinogen, Urine 0.2 0.2, 1.0 EU/DL    Poc Nitrite, Urine POSITIVE (A) NEGATIVE    POC Leukocytes, Urine LARGE (3+) (A) NEGATIVE      Assessment/Plan   Problem List Items Addressed This Visit    None  Visit Diagnoses         Codes    Dysuria    -  Primary R30.0    Relevant Orders    POCT UA (nonautomated) manually resulted (Completed)    Acute cystitis with hematuria     N30.01    Relevant Medications    sulfamethoxazole-trimethoprim (Bactrim DS) 800-160 mg tablet    Other Relevant Orders    Urine Culture    Urinalysis with Reflex Microscopic    Current every day smoker     F17.200        Karolina is a pleasant 59 year old female here for 1 day of left flank pain without other urinary or systemic symptoms. In office UA indicative of UTI with +nitrites, LE and blood. Will treat with bactrim BID x 3 days. Urine culture pending. Given heavy smoking history will recheck UA in 1 month to ensure hematuria resolves.    Disc smoking in depth and consequences to health. Recommend cutting down and eventual cessation to improve overall health.    Discussed with Attending,    Ni Shrestha, DO PGY-3

## 2024-06-07 LAB
APPEARANCE UR: ABNORMAL
BACTERIA #/AREA URNS AUTO: ABNORMAL /HPF
BILIRUB UR STRIP.AUTO-MCNC: NEGATIVE MG/DL
COLOR UR: ABNORMAL
GLUCOSE UR STRIP.AUTO-MCNC: NORMAL MG/DL
KETONES UR STRIP.AUTO-MCNC: NEGATIVE MG/DL
LEUKOCYTE ESTERASE UR QL STRIP.AUTO: ABNORMAL
NITRITE UR QL STRIP.AUTO: NEGATIVE
PH UR STRIP.AUTO: 6 [PH]
PROT UR STRIP.AUTO-MCNC: ABNORMAL MG/DL
RBC # UR STRIP.AUTO: NEGATIVE /UL
RBC #/AREA URNS AUTO: ABNORMAL /HPF
SP GR UR STRIP.AUTO: 1.01
SQUAMOUS #/AREA URNS AUTO: ABNORMAL /HPF
UROBILINOGEN UR STRIP.AUTO-MCNC: NORMAL MG/DL
WBC #/AREA URNS AUTO: >50 /HPF

## 2024-06-09 LAB — BACTERIA UR CULT: ABNORMAL

## 2024-06-10 NOTE — RESULT ENCOUNTER NOTE
Please notify Karolina that her urine culture came back showing E. Coli which is the most common bacteria in UTI. The antibiotic should have treated her symptoms. If she is having any more problems, recommend she make an appointment with myself or Dr. Ayala.    Thank you.

## 2024-06-21 ENCOUNTER — TELEPHONE (OUTPATIENT)
Dept: PRIMARY CARE | Facility: CLINIC | Age: 59
End: 2024-06-21
Payer: COMMERCIAL

## 2024-06-21 DIAGNOSIS — Z78.0 MENOPAUSE: Primary | ICD-10-CM

## 2024-06-26 ENCOUNTER — APPOINTMENT (OUTPATIENT)
Dept: OTOLARYNGOLOGY | Facility: CLINIC | Age: 59
End: 2024-06-26
Payer: COMMERCIAL

## 2024-06-26 VITALS — HEIGHT: 63 IN | BODY MASS INDEX: 30.49 KG/M2 | WEIGHT: 172.1 LBS

## 2024-06-26 DIAGNOSIS — R05.3 CHRONIC COUGH: ICD-10-CM

## 2024-06-26 DIAGNOSIS — R09.82 POST-NASAL DRAINAGE: Primary | ICD-10-CM

## 2024-06-26 DIAGNOSIS — J34.3 HYPERTROPHY OF BOTH INFERIOR NASAL TURBINATES: ICD-10-CM

## 2024-06-26 DIAGNOSIS — R44.8 FACIAL PRESSURE: ICD-10-CM

## 2024-06-26 PROCEDURE — 31231 NASAL ENDOSCOPY DX: CPT | Performed by: OTOLARYNGOLOGY

## 2024-06-26 PROCEDURE — 99213 OFFICE O/P EST LOW 20 MIN: CPT | Performed by: OTOLARYNGOLOGY

## 2024-06-26 ASSESSMENT — PATIENT HEALTH QUESTIONNAIRE - PHQ9
SUM OF ALL RESPONSES TO PHQ9 QUESTIONS 1 & 2: 0
2. FEELING DOWN, DEPRESSED OR HOPELESS: NOT AT ALL
1. LITTLE INTEREST OR PLEASURE IN DOING THINGS: NOT AT ALL

## 2024-06-26 ASSESSMENT — PAIN SCALES - GENERAL: PAINLEVEL: 0-NO PAIN

## 2024-06-26 NOTE — PROGRESS NOTES
Chief Complaint:  1. 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 Rhinorrhea  3. Shooting pain right side of her face  4. Headaches, history of migraines   5. Throat clearing, coughing, dysphonia   6. History of significant right-sided facial trauma  7. Bilateral tinnitus     History Of Present Illness:    Karolina Vogt presents since last being seen 12/27/23.    Over the last several months she has noticed an issue where she will breathe in and feel that her throat is closing and then she coughs and it opens up and she returns to her baseline.  This can happen when laying on her back or upright.  It happens multiple times per day.  There is no specific trigger.  She does have a history of COPD.  Outside of these events she denies issues with swallowing.    Main Symptoms:  Patient does not have anterior nasal drainage.     Patient has  posterior nasal drainage.  Not consistent.   Patient does not have nasal airway obstruction.   Patient has  facial pain.  Right > 50% of the time  Patient has  facial pressure.  Right > 50% of the time  Patient does not have decreased sense of smell.   Associated Symptoms:   Patient has  headaches.    Patient does not have throat clearing.    Patient has coughing.    Patient has dysphonia.   Patient does not have nasal bleeding.     Medications currently on for sinonasal symptoms:   None.  Medications tried in the past for sinonasal symptoms:  Nasacort, Ipratropium, Azelastine    Active Problems:  Patient Active Problem List   Diagnosis    Acquired hallux valgus of both feet    Asymmetrical sensorineural hearing loss    Bipolar disorder (Multi)    Chondromalacia of right patella    Chronic constipation    Irritable bowel syndrome    COPD (chronic obstructive pulmonary disease) (Multi)    Fatty liver    Gastroesophageal reflux disease without esophagitis    Hallux valgus, left    Hyperlipidemia    Hypothyroid    KELLEE (iron deficiency anemia)    Inverted  papilloma of nasal cavity    Laryngopharyngeal reflux (LPR)    Melasma    Meniere's disease of right ear    Migraine with visual aura    Nikole bullosa    Osteoarthritis of patellofemoral joints of both knees    Patellofemoral pain syndrome of both knees    Papilloma    Paroxysmal SVT (supraventricular tachycardia) (CMS-HCC)    Peripheral arterial disease (CMS-HCC)    Schizoaffective disorder (Multi)    Venous insufficiency    Tinnitus of both ears    Crepitus of temporomandibular joint on opening of jaw    Alpha-1-antitrypsin deficiency (Multi)    Other epilepsy without status epilepticus, not intractable (Multi)    Cervical cancer screening        Past Medical History:  She has a past medical history of Chronic obstructive pulmonary disease with (acute) exacerbation (Multi) (10/12/2020), Chronic obstructive pulmonary disease with (acute) exacerbation (Multi) (10/12/2020), Chronic obstructive pulmonary disease with (acute) exacerbation (Multi) (10/12/2020), Other specified postprocedural states, Personal history of other endocrine, nutritional and metabolic disease, and Personal history of other specified conditions.    Surgical History:  She has a past surgical history that includes Foot surgery (10/20/2014) and Knee arthroscopy w/ debridement (10/20/2014).     Family History:  No family history on file.    Social History:  She reports that she has been smoking cigarettes. She has never used smokeless tobacco. She reports current alcohol use. She reports that she does not currently use drugs after having used the following drugs: Marijuana.     Allergies:  Nsaids (non-steroidal anti-inflammatory drug), Bupropion, Caffeine, Cephalosporins, Diphenhydramine, Erythromycin, Gabapentin, Pramipexole, Tramadol, Trazodone, and Azithromycin    Current Meds:    Current Outpatient Medications:     albuterol 0.63 mg/3 mL nebulizer solution, Inhale 3 mL every 4 hours., Disp: , Rfl:     ALPRAZolam (Xanax) 1 mg tablet, Take 1  "tablet (1 mg) by mouth 2 times a day., Disp: , Rfl:     clomiPRAMINE (Anafranil) 50 mg capsule, Take 2 capsules (100 mg) by mouth once daily at bedtime., Disp: , Rfl:     cyanocobalamin (Vitamin B-12) 500 mcg tablet, Take 1 tablet (500 mcg) by mouth once daily., Disp: , Rfl:     dexlansoprazole (Dexilant) 60 mg DR capsule, Take 1 capsule (60 mg) by mouth once daily., Disp: 30 capsule, Rfl: 3    diclofenac sodium (Voltaren) 1 % gel, Apply 4.5 inches (4 g) topically 4 times a day as needed (left knee pain)., Disp: 100 g, Rfl: 1    haloperidol (Haldol) 5 mg tablet, Take 1 tablet (5 mg) by mouth once daily., Disp: , Rfl:     levothyroxine (Synthroid, Levoxyl) 50 mcg tablet, Take 1 tablet (50 mcg) by mouth once daily., Disp: 30 tablet, Rfl: 2    prazosin (Minipress) 5 mg capsule, , Disp: , Rfl:     roflumilast (Daliresp) 500 mcg tablet, Take 1 tablet (500 mcg) by mouth once daily., Disp: 30 tablet, Rfl: 11    simvastatin (Zocor) 20 mg tablet, Take 1 tablet (20 mg) by mouth once daily., Disp: 90 tablet, Rfl: 1    Trelegy Ellipta 100-62.5-25 mcg blister with device, Inhale 1 puff once daily., Disp: , Rfl:     trihexyphenidyl (Artane) 5 mg tablet, Take by mouth., Disp: , Rfl:     Ubrelvy 100 mg tablet tablet, Take 1 tablet (100 mg) by mouth if needed (migraine)., Disp: 16 tablet, Rfl: 6    Vitals:  Visit Vitals  Ht 1.6 m (5' 3\")   Wt 78.1 kg (172 lb 1.6 oz)   LMP 01/01/2010   BMI 30.49 kg/m²   OB Status Postmenopausal   Smoking Status Every Day   BSA 1.86 m²        Physical Exam:  Nose: On external exam there are neither lesions nor asymmetry of the nasal tip/dorsum. On anterior rhinoscopy, visualization posteriorly is limited on anterior examination. For this reason, to adequately evaluate posteriorly for masses, source of epistaxis, polypoid disease, debridement, and/or signs of infections, nasal endoscopy is indicated.  (Please see procedure below.)    SINONASAL ENDOSCOPY (CPT 99590): To better evaluate the patient's " symptoms, sinonasal endoscopy is indicated.  After discussion of risks and benefits, and topical decongestion and anesthesia,an endoscope was used to perform nasal endoscopy on each side.  A time out identifying the patient, the procedure, the location of the procedure and any concerns was performed prior to beginning the procedure.    Findings:  Examination of the left nasal cavity revealed no pus or polyps at the middle meatus or sphenoethmoid recess.  She has stable smooth fullness of the posterior aspect of the middle turbinate.  This does not have the appearance of a papilloma but rather hypertrophy.  Examination of the right nasal cavity revealed a normal middle meatus and sphenoethmoid recess without pus or polyps.  Nasopharynx was normal.  She had inferior turbinate hypertrophy with a good decongestant response.    The scope was advanced to view the larynx and hypopharynx without specific abnormality.  Contact information was provided today.    Provider Impressions:  1. 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 Postnasal drainage  3. Shooting pain right side of her face  4. Headaches, history of migraines   5. Coughing, dysphonia   6. History of significant right-sided facial trauma  7. Bilateral tinnitus     Discussion:  Karolina Vogt and I discussed her throat symptoms and I recommended evaluation with Dr. Chauhan or Michel within our laryngology division.    In regard to her nose and sinuses, we again discussed the finding along her left middle turbinate.  I reviewed her previous operative report and pathology.  She had a left larry bullosa resected and it was this region that returned as having papilloma.  I question whether or not she could have a mucocele within the resected larry bullosa as this would certainly have the appearance of what I am visualizing clinically.  I think a CT sinus at some point in time would make sense to confirm this suspicion.    I asked her  to follow-up with me in 6 months.  All questions were answered.    Signature:  Ramesh Tilley MD

## 2024-07-09 ENCOUNTER — OFFICE VISIT (OUTPATIENT)
Dept: PRIMARY CARE | Facility: CLINIC | Age: 59
End: 2024-07-09
Payer: COMMERCIAL

## 2024-07-09 VITALS
RESPIRATION RATE: 20 BRPM | OXYGEN SATURATION: 90 % | BODY MASS INDEX: 31.71 KG/M2 | SYSTOLIC BLOOD PRESSURE: 118 MMHG | TEMPERATURE: 98.2 F | WEIGHT: 179 LBS | DIASTOLIC BLOOD PRESSURE: 75 MMHG | HEART RATE: 104 BPM

## 2024-07-09 DIAGNOSIS — L03.114 CELLULITIS OF FOREARM, LEFT: Primary | ICD-10-CM

## 2024-07-09 DIAGNOSIS — T30.0 BURN: ICD-10-CM

## 2024-07-09 PROCEDURE — 99213 OFFICE O/P EST LOW 20 MIN: CPT

## 2024-07-09 RX ORDER — MUPIROCIN 20 MG/G
OINTMENT TOPICAL 3 TIMES DAILY
Qty: 22 G | Refills: 0 | Status: SHIPPED | OUTPATIENT
Start: 2024-07-09 | End: 2024-07-19

## 2024-07-09 RX ORDER — AMOXICILLIN AND CLAVULANATE POTASSIUM 875; 125 MG/1; MG/1
875 TABLET, FILM COATED ORAL 2 TIMES DAILY
Qty: 14 TABLET | Refills: 0 | Status: SHIPPED | OUTPATIENT
Start: 2024-07-09 | End: 2024-07-16

## 2024-07-09 ASSESSMENT — ENCOUNTER SYMPTOMS
WHEEZING: 0
DIARRHEA: 0
PALPITATIONS: 0
ACTIVITY CHANGE: 0
NAUSEA: 0
FATIGUE: 0
ABDOMINAL PAIN: 0
SHORTNESS OF BREATH: 0
CHILLS: 0
FEVER: 0
COUGH: 0

## 2024-07-09 NOTE — PROGRESS NOTES
Subjective   Patient ID: Karolina Vogt is a 59 y.o. female who presents for evaluation of burn on her left arm.    HPI  Patient reports that 5 days ago on the 4th of July while she was cooking on a charcoal grill her dog ran by the grill and a brickette flew out and landed on her left inner arm. The area did blister and drain. She dies any white discharge from the area. The pain has been getting worse and she has noticed more redness in the area surrounding the burn. She says that the entire area feels tight.  She has been treating the area with hydrogen peroxide and neosporin daily and keeping the wound uncovered.   Using extra strength tylenol 2 pills  every 6 hours for pain control    Denies fevers and chills    Review of Systems   Constitutional:  Negative for activity change, chills, fatigue and fever.   Respiratory:  Negative for cough, shortness of breath and wheezing.    Cardiovascular:  Negative for chest pain, palpitations and leg swelling.   Gastrointestinal:  Negative for abdominal pain, diarrhea and nausea.   Skin:         Burn to left forearm- see HPI      Past Medical History:   Diagnosis Date    Chronic obstructive pulmonary disease with (acute) exacerbation (Multi) 10/12/2020    COPD exacerbation    Chronic obstructive pulmonary disease with (acute) exacerbation (Multi) 10/12/2020    COPD exacerbation    Chronic obstructive pulmonary disease with (acute) exacerbation (Multi) 10/12/2020    COPD exacerbation    Other specified postprocedural states     H/O ovarian cystectomy    Personal history of other endocrine, nutritional and metabolic disease     History of alpha-1-antitrypsin deficiency    Personal history of other specified conditions     History of dyspnea     Past Surgical History:   Procedure Laterality Date    FOOT SURGERY  10/20/2014    Foot Surgery    KNEE ARTHROSCOPY W/ DEBRIDEMENT  10/20/2014    Knee Arthroscopy (Therapeutic)     Social History     Tobacco Use    Smoking status: Every  Day     Current packs/day: 0.50     Types: Cigarettes    Smokeless tobacco: Never   Vaping Use    Vaping status: Never Used   Substance Use Topics    Alcohol use: Yes     Comment: daily 1 cocktail    Drug use: Not Currently     Types: Marijuana     No family history on file.  Allergies   Allergen Reactions    Nsaids (Non-Steroidal Anti-Inflammatory Drug) Nausea/vomiting    Bupropion Seizure     seizures with wellbutrin    Caffeine Unknown     vomitting    Cephalosporins Nausea Only and Nausea/vomiting    Diphenhydramine Seizure    Erythromycin Nausea Only    Gabapentin Unknown    Pramipexole Unknown    Tramadol Nausea Only and Other     PT REPORTS DOES NOT MIXX WELL WITH PSYCH MEDS    Trazodone Unknown     night terrors    Azithromycin Nausea Only and Rash     Immunization History   Administered Date(s) Administered    Hep A / Hep B 11/01/2023    Pfizer COVID-19 vaccine, bivalent, age 12 years and older (30 mcg/0.3 mL) 09/22/2022    Pfizer Purple Cap SARS-CoV-2 03/25/2021, 04/13/2021, 11/27/2021    Tdap vaccine, age 7 year and older (BOOSTRIX, ADACEL) 04/13/2024     Current Outpatient Medications   Medication Instructions    albuterol 0.63 mg/3 mL nebulizer solution 3 mL, inhalation, Every 4 hours RT    ALPRAZolam (XANAX) 1 mg, oral, 2 times daily    amoxicillin-pot clavulanate (Augmentin) 875-125 mg tablet 875 mg, oral, 2 times daily    clomiPRAMINE (ANAFRANIL) 100 mg, oral, Nightly    cyanocobalamin (Vitamin B-12) 500 mcg tablet 1 tablet, oral, Daily    dexlansoprazole (DEXILANT) 60 mg, oral, Daily    diclofenac sodium (VOLTAREN) 4 g, Topical, 4 times daily PRN    haloperidol (HALDOL) 5 mg, oral, Daily    levothyroxine (SYNTHROID, LEVOXYL) 50 mcg, oral, Daily    mupirocin (Bactroban) 2 % ointment Topical, 3 times daily, apply to affected area    prazosin (Minipress) 5 mg capsule     roflumilast (DALIRESP) 500 mcg, oral, Daily    simvastatin (ZOCOR) 20 mg, oral, Daily    Trelegy Ellipta 100-62.5-25 mcg blister with  device 1 puff, inhalation, Daily    trihexyphenidyl (Artane) 5 mg tablet oral    Ubrelvy 100 mg, oral, As needed     Objective   Visit Vitals  /75 (BP Location: Right arm, Patient Position: Sitting)   Pulse 104   Temp 36.8 °C (98.2 °F)   Resp 20   Wt 81.2 kg (179 lb)   LMP 01/01/2010   SpO2 90%   BMI 31.71 kg/m²   OB Status Postmenopausal   Smoking Status Every Day   BSA 1.9 m²     No visits with results within 1 Month(s) from this visit.   Latest known visit with results is:   Office Visit on 06/06/2024   Component Date Value    POC Color, Urine 06/06/2024 Yellow     POC Appearance, Urine 06/06/2024 Cloudy (A)     POC Glucose, Urine 06/06/2024 NEGATIVE     POC Bilirubin, Urine 06/06/2024 NEGATIVE     POC Ketones, Urine 06/06/2024 NEGATIVE     POC Specific Gravity, Ur* 06/06/2024 1.020     POC Blood, Urine 06/06/2024 SMALL (1+) (A)     POC PH, Urine 06/06/2024 7.0     POC Protein, Urine 06/06/2024 NEGATIVE     POC Urobilinogen, Urine 06/06/2024 0.2     Poc Nitrite, Urine 06/06/2024 POSITIVE (A)     POC Leukocytes, Urine 06/06/2024 LARGE (3+) (A)     Color, Urine 06/06/2024 Light-Ehrenberg (N)     Appearance, Urine 06/06/2024 Turbid (N)     Specific Gravity, Urine 06/06/2024 1.015     pH, Urine 06/06/2024 6.0     Protein, Urine 06/06/2024 10 (TRACE)     Glucose, Urine 06/06/2024 Normal     Blood, Urine 06/06/2024 NEGATIVE     Ketones, Urine 06/06/2024 NEGATIVE     Bilirubin, Urine 06/06/2024 NEGATIVE     Urobilinogen, Urine 06/06/2024 Normal     Nitrite, Urine 06/06/2024 NEGATIVE     Leukocyte Esterase, Urine 06/06/2024 500 [place]/µL (A)     Urine Culture 06/06/2024 >100,000 Escherichia coli (A)     WBC, Urine 06/06/2024 >50 (A)     RBC, Urine 06/06/2024 3-5     Squamous Epithelial Cell* 06/06/2024 1-9 (SPARSE)     Bacteria, Urine 06/06/2024 1+ (A)        Physical Exam  Constitutional:       Appearance: Normal appearance.   Cardiovascular:      Rate and Rhythm: Normal rate and regular rhythm.   Pulmonary:       Effort: Pulmonary effort is normal.      Breath sounds: Normal breath sounds.   Skin:     General: Skin is warm and dry.      Capillary Refill: Capillary refill takes less than 2 seconds.      Comments: 7 cm superficial burn on left inner forearm covering her tattoo. Open blisters present in superior 3 cm of region. Erythematous border 2 cm border. Pain with palpation of area.    Neurological:      General: No focal deficit present.      Mental Status: She is alert.   Psychiatric:         Mood and Affect: Mood normal.         Thought Content: Thought content normal.         Judgment: Judgment normal.       Assessment/Plan   Problem List Items Addressed This Visit    None  Visit Diagnoses       Cellulitis of forearm, left    -  Primary    Relevant Medications    amoxicillin-pot clavulanate (Augmentin) 875-125 mg tablet    mupirocin (Bactroban) 2 % ointment    Burn        Relevant Medications    amoxicillin-pot clavulanate (Augmentin) 875-125 mg tablet    mupirocin (Bactroban) 2 % ointment            Evaluation of Karolina's burn on her left arm and her stated history of worsening pain is concerning for cellulitis. Exam revealed a superficial burn with open blisters, a surrounding area of erythema, and pain to palpation. Given concern for cellulitis mupirocin ointment and Augmentin were prescribed for 7 days. Discussed with patient to discontinue using hydrogen peroxide as this could impair the healing process. Also advised that she keep the wound covered with a non adhesive gauze and follow up in one week for evaluation.

## 2024-07-09 NOTE — PATIENT INSTRUCTIONS
Please keep wound covered with non adhesive gauze   Use mupirocin ointment to affected area    Please stop using hydrogen peroxide

## 2024-07-09 NOTE — PROGRESS NOTES
I saw and evaluated the patient. I personally obtained the key and critical portions of the history and physical exam or was physically present for key and critical portions performed by the resident/fellow. I reviewed the resident/fellow's documentation and discussed the patient with the resident/fellow. I agree with the resident/fellow's medical decision making as documented in the note.    Ramesh Mendez, DO

## 2024-07-23 ENCOUNTER — APPOINTMENT (OUTPATIENT)
Dept: OTOLARYNGOLOGY | Facility: CLINIC | Age: 59
End: 2024-07-23
Payer: COMMERCIAL

## 2024-07-23 VITALS
TEMPERATURE: 97.8 F | SYSTOLIC BLOOD PRESSURE: 144 MMHG | DIASTOLIC BLOOD PRESSURE: 88 MMHG | WEIGHT: 174 LBS | BODY MASS INDEX: 30.82 KG/M2

## 2024-07-23 DIAGNOSIS — H61.23 BILATERAL IMPACTED CERUMEN: ICD-10-CM

## 2024-07-23 DIAGNOSIS — H93.13 TINNITUS OF BOTH EARS: Primary | ICD-10-CM

## 2024-07-23 DIAGNOSIS — H90.3 SENSORINEURAL HEARING LOSS (SNHL) OF BOTH EARS: ICD-10-CM

## 2024-07-23 PROCEDURE — 69210 REMOVE IMPACTED EAR WAX UNI: CPT

## 2024-07-23 PROCEDURE — 99214 OFFICE O/P EST MOD 30 MIN: CPT

## 2024-07-23 NOTE — PROGRESS NOTES
Patient ID: Karolina Vogt is a 59 y.o. female who presents for subsequent evaluation of tinnitus. Patient is accompanied to visit today by her roommate, Ying.     PROVIDER IMPRESSIONS:  DIAGNOSES/PROBLEMS:  -Bilateral sensorineural hearing loss  -Bilateral tinnitus  -Bilateral cerumen impaction    ASSESSMENT:   Karolina Vogt is a pleasant 59 y.o. female with a history of bilateral SNHL who presents for subsequent evaluation of bilateral non-pulsatile tinnitus (right>left). Since last visit, symptoms remain unchanged with recommended treatment for TMJ dysfunction. Based on the clinical information provided, symptoms and clinical exam findings are consistent with tinnitus secondary to bilateral cerumen impaction and high-frequency SNHL vs. TMJ dysfunction vs. Psychologic conditions. Using appropriate instrumentation, impacted cerumen was successfully removed from the EAC bilaterally. Reassurance provided to patient that otologic exam today revealed no evidence of acute infection/inflammation in the EAC bilaterally and that TM appears with no evidence of infection, effusion, retraction or perforation bilaterally. The various etiologies of tinnitus were reviewed. I explained that subjective tinnitus is often a result of abnormalities within the auditory system that are without an identifiable cause. Patient was informed that no current treatments are clinically proven to provide a definitive cure for primary tinnitus. We discussed that treatment objectives for primary tinnitus aim to reduce the perceived burden and intensity of tinnitus in order to improve quality of life for the patient. Further counseling was provided to the patient on the approach to secondary tinnitus treatment, which includes evaluation and treatment for the specified underlying condition such as TMJ dysfunction.     PLAN:  I recommended cognitive behavioral therapy (CBT) for persistent, bothersome tinnitus that negatively impacts the patient’s  quality of life. I explained to the patient that CBT can assist with coping to reduce negative reactions to tinnitus, though it has not been shown to reduce the subjective volume intensity. Referral e-submitted and patient instructed to call and schedule.   Patient advised to wear ear hearing protection while in the presence of loud sounds. The patient was also counseled to utilize use tinnitus coping strategies as needed, such as sound apps on a smartphone, utilizing calming noise in the room, running a fan at night, etc.    I recommended referral to TMJ specialist Dr. Jose Krishnan at [facility] for further evaluation and management of TMD. Patient provided contact information to schedule with Dr. Krishnan.   I recommended patient continues with TMD home management strategies as discussed at prior visit.    Other treatments for persistent, bothersome tinnitus may be considered as optional recommendations given the lack of supportive evidence. These include masking techniques, tinnitus retraining therapy, acupuncture, and transcranial magnetic stimulation. The patient may also limit excessive caffeine and/or alcohol consumption to determine if factors elicit tinnitus exacerbation.   I provided patient with smoking cessation counseling. I informed patient that smoking may exacerbate tinnitus symptoms. Patient advised to follow-up with PCP to discuss medical management therapy options for cessation.   I counseled patient on o.t.c. dietary supplements for tinnitus including oral N-acetylcysteine and lipoflavinoid. I explained to patient that these supplements do not have any clinical evidence to support treatment for tinnitus.   Follow-up: Patient may schedule for follow-up with me as needed. Patient is agreeable to this plan, all questions were answered to patient's satisfaction.     At today's visit with Karolina Vogt, the number of minutes I spent providing patient care was 30. More than 50% of that  time was spent counseling the patient on possible etiologies, test results, treatment options and care coordination.     Subjective   HPI: Karolina Vogt is a 59 y.o. female with a history of paranoid schizophrenia and mandible fracture who presents for 6 month follow-up for bilateral tinnitus. Since last visit on 1/23/24, there has been no improvement in symptoms. They report ongoing symptoms of tinnitus which have worsened in the right ear since the prior visit. She continues to describe tinnitus as a continuous non-pulsatile buzzing/ringing sound with audible clicking with jaw movement. In review, previous recommendations included home management strategies and PT for TMJ dysfunction. Patient states they have been consistent with the recommended treatment and attended 10 sessions of PT with no symptom benefit. Denies the presence of new symptoms including hearing loss, ear pain, ear itching, ear drainage, ear fullness/pressure, autophony, dizziness and/or vertigo. She is a current everyday smoker. Mentions she is followed by my rhinology partner Dr. Ramesh Tilley for sinonasal conditions.     RECALL 1/23/24:  HPI: Karolina Vogt is a 59 y.o. female who presents for the evaluation of tinnitus.  The patient states that symptom onset began a few years ago, but has gradually progressed over the past 1 year. She describes tinnitus as bilateral, continuous, non-pulsatile sound of crickets/hissing in both ears, mentions that tinnitus is significantly worse in the right ear but present in both ears, and does not cause nighttime wakening. When asked about the presence of hearing loss, ear pain, ear fullness/pressure,  ear itching, ear drainage, autophony, dizziness or vertigo, she admits to none. When asked about pertinent otologic history, the patient denies a history of recurrent ear infections, denies history of ear surgery, denies history of PE tube insertion, and reports history of prolonged/traumatic loud noise  exposure. The patient does not insert Q-tips in the ear canals, and  denies insertion of other foreign objects into the ear canals. The patient denies history of wearing hearing aid devices. The patient does not endorse a family history of hearing loss. 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 Reports continuous/daily jaw-clenching and teeth grinding for many years. Patient was seen by Dr. Tilley on 12/27/2023 with findings of left sinonasal papilloma and advised to use Azelastine, Ipratroprium Bromide, and Nasacort for sinonasal symptoms. Other pertinent past medical history  reported includes history of migraines, history of fall with right-sided mandible/orbital fracture (15 yrs ago), paranoid schizophrenia (managed with psychiatry), and COPD. She denies history of allergy/immunology testing. Surgical history includes resection of left larry bullosa with Dr. Hang Woo in December 2020. She is a current smoker.   ASSESSMENT: Karolina Vogt is a pleasant 59 y.o. female who presents with symptoms of bilateral, continuous, non-pulsatile tinnitus with greater volume intensity in the right ear. Based on the clinical information provided, symptoms and clinical exam findings are consistent with bilateral sensorineural hearing loss vs. TMJ dysfunction. Exam today revealed bilateral cerumen impaction (right>left occlusion) and TMJ crepitus with jaw opening that was more prominent on the right. Using appropriate instrumentation, the impacted cerumen was successfully removed from bilateral EACs. Reassurance provided to patient that otologic exam today revealed no evidence of acute infection/inflammation in the external auditory canal (EAC) bilaterally and that tympanic membrane (TM) appears with no evidence of infection, effusion, retraction or perforation bilaterally. Audiogram reviewed in detail with the patient, which revealed mild to moderate bilateral high-frequency SNHL. The etiology of  temporomandibular joint disorders (TMD) was discussed in detail with patient including: structural issues such as alterations in dental occlusion (the alignment of the upper and lower teeth) that affect maxillomandibular position and function. These issues may or may not be associated with joint trauma, poor posture or cervicogenic etiologies. Possible psychologic factors include anxiety, depression and PTSD. Multiple other contributing and comorbid factors, including biological, behavioral, environmental, and cognitive disorders which can all contribute to the development of symptoms were also reviewed with the patient. I explained that her reported history of continual jaw-clenching/bruxism, neck tightness/tension, and right-sided facial trauma with surgical cheekbone replacement may all correlate to TMJ dysfunction that can exacerbate tinnitus symptoms.  PLAN:  I recommended the following strategies for TMJ symptom management:   Soft diet: for the next 2 weeks, please avoid eating chewy or tough foods such as hamburgers, hot dogs, pizza, steak, meats, raw fruits and vegetables, or anything else that requires significant mastication. Examples of appropriate soft foods include mashed potatoes, soft pasta, macaroni, yogurt, ice cream, and other things that could easily be mashed with a fork.   Temple and cheekbone massages: using your knuckles, gently massage in circles near temples and along your cheekbones as tolerated.   Warm or cold compresses: apply along the entire side of the affected face for no more than 20 minutes at a time, remove sooner if skin irritation occurs.    Mouth guard: consider purchase of a mouth guard to prevent jaw clenching and/or teeth grinding during sleep.   REFERRAL TMJ PHYSICAL THERAPY: I recommended referral to TMJ physical therapy for evaluation and management, given the severity of TMD symptoms/findings. Internal referral e-submitted and patient provided a paper copy of requisition  with instruction to call and schedule with  PT services.   I counseled patient on other treatments for persistent, bothersome tinnitus may be considered as optional recommendations given the lack of supportive evidence. These include masking techniques and sound cover-up strategies for tinnitus of primary etiology. I advised that patient limits excessive caffeine and/or alcohol consumption to determine if factors elicit tinnitus exacerbation. We discussed that her hearing function is essentially normal bilaterally in frequencies under 3000 Hz, and thus hearing aid devices are not indicated at this time and may not sufficiently improve tinnitus from a primary etiology.   Follow-up: Patient may schedule for follow-up in 6 months to evaluate treatment outcomes for tinnitus. They may follow-up sooner, if needed. Patient is agreeable to this plan, all questions were answered to patient's satisfaction.     PATIENT HISTORY:  Past Medical History:   Diagnosis Date    Chronic obstructive pulmonary disease with (acute) exacerbation (Multi) 10/12/2020    COPD exacerbation    Chronic obstructive pulmonary disease with (acute) exacerbation (Multi) 10/12/2020    COPD exacerbation    Chronic obstructive pulmonary disease with (acute) exacerbation (Multi) 10/12/2020    COPD exacerbation    Other specified postprocedural states     H/O ovarian cystectomy    Personal history of other endocrine, nutritional and metabolic disease     History of alpha-1-antitrypsin deficiency    Personal history of other specified conditions     History of dyspnea      Past Surgical History:   Procedure Laterality Date    FOOT SURGERY  10/20/2014    Foot Surgery    KNEE ARTHROSCOPY W/ DEBRIDEMENT  10/20/2014    Knee Arthroscopy (Therapeutic)      Allergies   Allergen Reactions    Nsaids (Non-Steroidal Anti-Inflammatory Drug) Nausea/vomiting    Bupropion Seizure     seizures with wellbutrin    Caffeine Unknown     vomitting    Cephalosporins Nausea  Only and Nausea/vomiting    Diphenhydramine Seizure    Erythromycin Nausea Only    Gabapentin Unknown    Pramipexole Unknown    Tramadol Nausea Only and Other     PT REPORTS DOES NOT MIXX WELL WITH PSYCH MEDS    Trazodone Unknown     night terrors    Azithromycin Nausea Only and Rash        Current Outpatient Medications:     albuterol 0.63 mg/3 mL nebulizer solution, Inhale 3 mL every 4 hours., Disp: , Rfl:     ALPRAZolam (Xanax) 1 mg tablet, Take 1 tablet (1 mg) by mouth 2 times a day., Disp: , Rfl:     clomiPRAMINE (Anafranil) 50 mg capsule, Take 2 capsules (100 mg) by mouth once daily at bedtime., Disp: , Rfl:     cyanocobalamin (Vitamin B-12) 500 mcg tablet, Take 1 tablet (500 mcg) by mouth once daily., Disp: , Rfl:     dexlansoprazole (Dexilant) 60 mg DR capsule, Take 1 capsule (60 mg) by mouth once daily., Disp: 30 capsule, Rfl: 3    diclofenac sodium (Voltaren) 1 % gel, Apply 4.5 inches (4 g) topically 4 times a day as needed (left knee pain)., Disp: 100 g, Rfl: 1    haloperidol (Haldol) 5 mg tablet, Take 1 tablet (5 mg) by mouth once daily., Disp: , Rfl:     levothyroxine (Synthroid, Levoxyl) 50 mcg tablet, Take 1 tablet (50 mcg) by mouth once daily., Disp: 30 tablet, Rfl: 2    prazosin (Minipress) 5 mg capsule, , Disp: , Rfl:     roflumilast (Daliresp) 500 mcg tablet, Take 1 tablet (500 mcg) by mouth once daily., Disp: 30 tablet, Rfl: 11    simvastatin (Zocor) 20 mg tablet, Take 1 tablet (20 mg) by mouth once daily., Disp: 90 tablet, Rfl: 1    Trelegy Ellipta 100-62.5-25 mcg blister with device, Inhale 1 puff once daily., Disp: , Rfl:     trihexyphenidyl (Artane) 5 mg tablet, Take by mouth., Disp: , Rfl:     Ubrelvy 100 mg tablet tablet, Take 1 tablet (100 mg) by mouth if needed (migraine)., Disp: 16 tablet, Rfl: 6   Tobacco Use: High Risk (7/9/2024)    Patient History     Smoking Tobacco Use: Every Day     Smokeless Tobacco Use: Never     Passive Exposure: Not on file      Alcohol Use: Not on file       Social History     Substance and Sexual Activity   Drug Use Not Currently    Types: Marijuana        Review of Systems   All other systems negative.     Objective   ENT FOCUSED PHYSICAL EXAM:   Right Ear: External inspection of ear with no deformity, scars, or masses. EAC is partially impacted with cerumen. Unable to visualize tympanic membrane.  Left Ear: External inspection of ear with no deformity, scars, or masses. EAC is partially impacted with cerumen. Unable to visualize tympanic membrane.     EAR CLEANING PROCEDURE NOTE:  Indication: Cerumen impaction  Location: bilateral ear canals  Procedure Note: The procedure was performed by the provider.  Visualization Instrument: A microscope with a #5 speculum was placed in the ear canals to visualize the ear canal debris.  Ear Cleaning Instrument and Outcome: Using the suction, a small amount of soft, yellow cerumen was removed from the impacted EAC(s).  Post-Procedure/Microscopic Otologic Exam:  Right Ear--TM is intact with no sign of infection, effusion, or retraction.  No perforation seen. EAC is clear. Auto insufflation visible under microscopy.  Left Ear-- TM is intact with no sign of infection, effusion, or retraction.  No perforation seen. EAC is clear. Auto insufflation visible under microscopy.  Patient Status: The patient tolerated the procedure well.  Complications: There were no complications.    Magy Key, APRN-CNP

## 2024-07-26 ENCOUNTER — APPOINTMENT (OUTPATIENT)
Dept: PRIMARY CARE | Facility: CLINIC | Age: 59
End: 2024-07-26
Payer: COMMERCIAL

## 2024-07-30 ENCOUNTER — APPOINTMENT (OUTPATIENT)
Dept: PRIMARY CARE | Facility: CLINIC | Age: 59
End: 2024-07-30
Payer: COMMERCIAL

## 2024-07-30 VITALS
SYSTOLIC BLOOD PRESSURE: 126 MMHG | WEIGHT: 173 LBS | BODY MASS INDEX: 30.65 KG/M2 | OXYGEN SATURATION: 94 % | RESPIRATION RATE: 18 BRPM | TEMPERATURE: 97.6 F | DIASTOLIC BLOOD PRESSURE: 83 MMHG | HEART RATE: 92 BPM

## 2024-07-30 DIAGNOSIS — L03.114 CELLULITIS OF FOREARM, LEFT: Primary | ICD-10-CM

## 2024-07-30 DIAGNOSIS — J41.1 MUCOPURULENT CHRONIC BRONCHITIS (MULTI): ICD-10-CM

## 2024-07-30 DIAGNOSIS — T30.0 BURN: ICD-10-CM

## 2024-07-30 PROCEDURE — 99214 OFFICE O/P EST MOD 30 MIN: CPT

## 2024-07-30 ASSESSMENT — ENCOUNTER SYMPTOMS
WOUND: 1
HEADACHES: 0
WHEEZING: 1
DIFFICULTY URINATING: 0
COUGH: 1
ABDOMINAL PAIN: 0
SHORTNESS OF BREATH: 0
FEVER: 0
SORE THROAT: 0
PALPITATIONS: 0
LIGHT-HEADEDNESS: 0
WEAKNESS: 0
DIZZINESS: 0
CHILLS: 0

## 2024-07-30 NOTE — ASSESSMENT & PLAN NOTE
-Hx of COPD GOLD III w/ MMRCII and Nicotine dependence  -Today feels like breathing is at her baseline, but O2 sat initially 84%; she states that is ranges from mid 60s to mid 90s at home.   -She is uses her rescue inhaler 1-2 times a month and denies any recent illnesses/fevers or chills  -Ambulatory pulse ox ranged from 89%-92%, and then at rest she was 94 before ambulation  -Encouraged her to use her supplemental oxygen when her O2 levels get below 88 and while asleep  -ED precautions discussed and importance of follow up with her pulmonologist reinforced

## 2024-07-30 NOTE — PROGRESS NOTES
Subjective   Karolina Vogt is a 59 y.o. female who presents for Burn (Follow up on burn on left arm).    HPI  This is a 59-year-old female past medical history significant for PAD, COPD, hypothyroid, and KELLEE presenting for a wound check. She was seen by Dr. Watkins on 7/9 for a burn that happened on her L arm, the area did blister and drain initially, but the pain progressively got worse; these finding were concerning for cellulitis and she was started on a 7 day course of Augmentin and mupirocin ointment. Since then she has been doing well. She completed her whole course of antibiotics and has not had any pain, swelling, redness, or drainage. She has been keeping it covered while she works and most times when covered with ointment.     Review of Systems   Constitutional:  Negative for chills and fever.   HENT:  Negative for congestion and sore throat.    Respiratory:  Positive for cough (chronic) and wheezing (at baseline). Negative for shortness of breath.    Cardiovascular:  Negative for chest pain and palpitations.   Gastrointestinal:  Negative for abdominal pain.   Genitourinary:  Negative for difficulty urinating.   Skin:  Positive for wound.   Neurological:  Negative for dizziness, weakness, light-headedness and headaches.       Objective   /83 (BP Location: Right arm, Patient Position: Sitting)   Pulse 92   Temp 36.4 °C (97.6 °F)   Resp 18   Wt 78.5 kg (173 lb)   LMP 01/01/2010   SpO2 94% Comment: Ambulatory Ox ranged from 89-92; seated 94%  BMI 30.65 kg/m²     Physical Exam  Vitals reviewed.   Constitutional:       Appearance: Normal appearance.   HENT:      Head: Normocephalic and atraumatic.      Right Ear: External ear normal.      Left Ear: External ear normal.   Eyes:      Conjunctiva/sclera: Conjunctivae normal.   Cardiovascular:      Rate and Rhythm: Normal rate and regular rhythm.   Pulmonary:      Effort: Pulmonary effort is normal.      Breath sounds: Normal breath sounds.    Musculoskeletal:      Cervical back: Normal range of motion.   Skin:     General: Skin is warm and dry.   Neurological:      Mental Status: She is alert and oriented to person, place, and time.      Gait: Gait normal.   Psychiatric:         Mood and Affect: Mood normal.         Behavior: Behavior normal.                Assessment & Plan  Cellulitis of forearm, left  -Last examined on 7/9, finished course of Augmentin and mupirocin  -PE today showed mostly scabbed over burn with healing blisters, some individual lesions with very small area of open wound  -No evidence of worsening cellulitis such as redness, or continued pain  -Follow up as needed or sooner for annual physical visit       Burn  -Last examined on 7/9, finished course of Augmentin and mupirocin  -PE today showed mostly scabbed over burn with healing blisters, some individual lesions with very small area of open wound  -Continue to use mupirocin ointment while wound is open and afterwards use moisturizing ointments such as Aquaphor/Vaseline to help with scarring  -Follow up as needed or sooner for your annual physical visit       Mucopurulent chronic bronchitis (Multi)  -Hx of COPD GOLD III w/ MMRCII and Nicotine dependence  -Today feels like breathing is at her baseline, but O2 sat initially 84%; she states that is ranges from mid 60s to mid 90s at home.   -She is uses her rescue inhaler 1-2 times a month and denies any recent illnesses/fevers or chills  -Ambulatory pulse ox ranged from 89%-92%, and then at rest she was 94 before ambulation  -Encouraged her to use her supplemental oxygen when her O2 levels get below 88 and while asleep  -ED precautions discussed and importance of follow up with her pulmonologist reinforced        Elena Jones DO  Family Medicine Resident, PGY-2  [facility]  [address] [place] [address] [phone]    This note may have been transcribed using Dragon voice recognition system and there  is a possibility of unintentional typing misprints.  Any information found to be copied from previous providers is done in the best interest of the patient to provide accurate, quality, and continuity of care.

## 2024-08-12 DIAGNOSIS — J41.8 MIXED SIMPLE AND MUCOPURULENT CHRONIC BRONCHITIS (MULTI): Primary | ICD-10-CM

## 2024-08-13 ENCOUNTER — TELEPHONE (OUTPATIENT)
Dept: PRIMARY CARE | Facility: CLINIC | Age: 59
End: 2024-08-13
Payer: COMMERCIAL

## 2024-08-13 DIAGNOSIS — E78.2 MIXED HYPERLIPIDEMIA: ICD-10-CM

## 2024-08-13 RX ORDER — SIMVASTATIN 20 MG/1
20 TABLET, FILM COATED ORAL DAILY
Qty: 90 TABLET | Refills: 1 | Status: SHIPPED | OUTPATIENT
Start: 2024-08-13

## 2024-08-13 NOTE — TELEPHONE ENCOUNTER
Rx Refill Request Telephone Encounter    Name:  Karolina Vogt  :  [id number]  Medication Name:            simvastatin (Zocor) 20 mg tablet        Sig: Take 1 tablet (20 mg) by mouth once daily.        Sent to pharmacy as: simvastatin 20 mg tablet (Zocor)          Specific Pharmacy location:    [id number] [address] AT [facility] [address] Phone: [phone]   Fax: [phone]

## 2024-08-16 RX ORDER — FLUTICASONE FUROATE, UMECLIDINIUM BROMIDE AND VILANTEROL TRIFENATATE 100; 62.5; 25 UG/1; UG/1; UG/1
1 POWDER RESPIRATORY (INHALATION) DAILY
Qty: 90 EACH | Refills: 3 | Status: SHIPPED | OUTPATIENT
Start: 2024-08-16 | End: 2025-08-16

## 2024-08-20 ENCOUNTER — TELEPHONE (OUTPATIENT)
Dept: PRIMARY CARE | Facility: CLINIC | Age: 59
End: 2024-08-20
Payer: COMMERCIAL

## 2024-08-28 DIAGNOSIS — E78.2 MIXED HYPERLIPIDEMIA: ICD-10-CM

## 2024-08-28 DIAGNOSIS — E03.8 OTHER SPECIFIED HYPOTHYROIDISM: ICD-10-CM

## 2024-08-28 RX ORDER — LEVOTHYROXINE SODIUM 50 UG/1
50 TABLET ORAL DAILY
Qty: 30 TABLET | Refills: 2 | Status: SHIPPED | OUTPATIENT
Start: 2024-08-28

## 2024-08-28 RX ORDER — SIMVASTATIN 20 MG/1
20 TABLET, FILM COATED ORAL DAILY
Qty: 90 TABLET | Refills: 1 | Status: SHIPPED | OUTPATIENT
Start: 2024-08-28

## 2024-09-20 ENCOUNTER — TELEPHONE (OUTPATIENT)
Dept: PRIMARY CARE | Facility: CLINIC | Age: 59
End: 2024-09-20
Payer: COMMERCIAL

## 2024-09-20 DIAGNOSIS — K21.9 GASTROESOPHAGEAL REFLUX DISEASE WITHOUT ESOPHAGITIS: ICD-10-CM

## 2024-09-20 RX ORDER — DEXLANSOPRAZOLE 60 MG/1
1 CAPSULE, DELAYED RELEASE ORAL DAILY
Qty: 30 CAPSULE | Refills: 3 | Status: SHIPPED | OUTPATIENT
Start: 2024-09-20

## 2024-09-20 NOTE — TELEPHONE ENCOUNTER
Medication refill:        dexlansoprazole (Dexilant) 60 mg DR capsule [[id number]]    Order Details  Dose: 1 capsule Route: oral Frequency: Daily   Dispense Quantity: 30 capsule Refills: 3          Sig: Take 1 capsule (60 mg) by mouth once daily.         [facility] #[id number] [place], [place] - [address] AT [facility]

## 2024-09-30 ENCOUNTER — OFFICE VISIT (OUTPATIENT)
Dept: ORTHOPEDIC SURGERY | Facility: CLINIC | Age: 59
End: 2024-09-30
Payer: COMMERCIAL

## 2024-09-30 ENCOUNTER — HOSPITAL ENCOUNTER (OUTPATIENT)
Dept: RADIOLOGY | Facility: CLINIC | Age: 59
Discharge: HOME | End: 2024-09-30
Payer: COMMERCIAL

## 2024-09-30 DIAGNOSIS — M17.11 PRIMARY LOCALIZED OSTEOARTHRITIS OF RIGHT KNEE: ICD-10-CM

## 2024-09-30 DIAGNOSIS — M25.561 ACUTE PAIN OF RIGHT KNEE: ICD-10-CM

## 2024-09-30 PROCEDURE — 73564 X-RAY EXAM KNEE 4 OR MORE: CPT | Mod: RT

## 2024-09-30 PROCEDURE — 2500000004 HC RX 250 GENERAL PHARMACY W/ HCPCS (ALT 636 FOR OP/ED): Performed by: PEDIATRICS

## 2024-09-30 PROCEDURE — 20610 DRAIN/INJ JOINT/BURSA W/O US: CPT | Mod: RT | Performed by: PEDIATRICS

## 2024-09-30 PROCEDURE — 99214 OFFICE O/P EST MOD 30 MIN: CPT | Performed by: PEDIATRICS

## 2024-09-30 PROCEDURE — 73564 X-RAY EXAM KNEE 4 OR MORE: CPT | Mod: RIGHT SIDE | Performed by: RADIOLOGY

## 2024-09-30 PROCEDURE — 99214 OFFICE O/P EST MOD 30 MIN: CPT | Mod: 25 | Performed by: PEDIATRICS

## 2024-09-30 RX ORDER — TRIAMCINOLONE ACETONIDE 40 MG/ML
40 INJECTION, SUSPENSION INTRA-ARTICULAR; INTRAMUSCULAR
Status: COMPLETED | OUTPATIENT
Start: 2024-09-30 | End: 2024-09-30

## 2024-09-30 NOTE — PROGRESS NOTES
Consulting physician: Jason Ayala, DO  A report with my findings and recommendations will be sent to the primary and referring physician via written or electronic means when information is available    History of Present Illness:  Karolina Vogt is a 59 y.o. female here with right knee pain x several months. She works on her feet all day and sometimes her knee give out on her. She has not tripped or fell.   No known injury  No catch, Lock  No swelling of joint but noticed a small area of swelling in anterior lateral knee. She tacos [place] around it and it is thee area of prior scope portal.  No redness or warmth    Prior Treatment:   Prior Evaluation by Physician: No  Physical Therapy: No  Medications: Yes - tylenol advil without relief  No recent treatment    Had meniscal repair in past ~15 years ago  No injections    Social Hx:  Work:  stands all day    Past MSK HX:  Specialty Problems    None    Medications:   Current Outpatient Medications on File Prior to Visit   Medication Sig Dispense Refill    albuterol 0.63 mg/3 mL nebulizer solution Inhale 3 mL every 4 hours.      ALPRAZolam (Xanax) 1 mg tablet Take 1 tablet (1 mg) by mouth 2 times a day.      clomiPRAMINE (Anafranil) 50 mg capsule Take 2 capsules (100 mg) by mouth once daily at bedtime.      cyanocobalamin (Vitamin B-12) 500 mcg tablet Take 1 tablet (500 mcg) by mouth once daily.      dexlansoprazole (Dexilant) 60 mg DR capsule Take 1 capsule (60 mg) by mouth once daily. 30 capsule 3    diclofenac sodium (Voltaren) 1 % gel Apply 4.5 inches (4 g) topically 4 times a day as needed (left knee pain). 100 g 1    haloperidol (Haldol) 5 mg tablet Take 1 tablet (5 mg) by mouth once daily.      levothyroxine (Synthroid, Levoxyl) 50 mcg tablet Take 1 tablet (50 mcg) by mouth once daily. 30 tablet 2    prazosin (Minipress) 5 mg capsule       roflumilast (Daliresp) 500 mcg tablet Take 1 tablet (500 mcg) by mouth once daily. 30 tablet 11    simvastatin (Zocor) 20 mg  tablet Take 1 tablet (20 mg) by mouth once daily. 90 tablet 1    Trelegy Ellipta 100-62.5-25 mcg blister with device Inhale 1 puff once daily. 90 each 3    trihexyphenidyl (Artane) 5 mg tablet Take by mouth.      Ubrelvy 100 mg tablet tablet Take 1 tablet (100 mg) by mouth if needed (migraine). 16 tablet 6     No current facility-administered medications on file prior to visit.     Allergies:    Allergies   Allergen Reactions    Nsaids (Non-Steroidal Anti-Inflammatory Drug) Nausea/vomiting    Bupropion Seizure     seizures with wellbutrin    Caffeine Unknown     vomitting    Cephalosporins Nausea Only and Nausea/vomiting    Diphenhydramine Seizure    Erythromycin Nausea Only    Gabapentin Unknown    Pramipexole Unknown    Tramadol Nausea Only and Other     PT REPORTS DOES NOT MIXX WELL WITH PSYCH MEDS    Trazodone Unknown     night terrors    Azithromycin Nausea Only and Rash        Physical Exam:  General appearance: Well-appearing well-nourished  Psych: Normal mood and affect  KNEE EXAM:  INSPECTION:  no soft tissue swelling, redness, or warmth  Well healed scars from prior scope portals  no skin changes  no deformities    FUNCTIONAL:  Gait with slight limp     MOTION:  log roll: no hip pain with Full PROM MONI  HIP Flex to 90/knee to 90: no hip pain with Full PROM MONI  Knee: Full PROM without PAIN MONI    PALPATION:  Effusion: none  Peripatellar: no TTP, Neg Grind, normal glide, neg tilt, neg apprehension  Joint line: Lateral + TTP  Quad tendon: WNL, NTTP  Patella tendon: WNL, NTTP  MCL: No Pain, NO opening at 0, 30d  LCL: No Pain, No opening at 0, 30d  Anterior Drawer: equal excursion moni with endpoint --> NEG  Posterior Drawer: no sag and good end point--> NEG  McMurrays: no joint line pain or catch--> NEG  Bounce: NEG    Imaging: Radiology images of the area of concern were ordered and independently viewed and interpreted in the presence of the patient's family.    L Inj/Asp: R knee on 9/30/2024 10:55  AM  Indications: pain and joint swelling  Details: 22 G needle, anterolateral approach  Medications: 40 mg triamcinolone acetonide 40 mg/mL  Outcome: tolerated well, no immediate complications  Procedure, treatment alternatives, risks and benefits explained, specific risks discussed. Consent was given by the patient. Immediately prior to procedure a time out was called to verify the correct patient, procedure, equipment, support staff and site/side marked as required. Patient was prepped and draped in the usual sterile fashion.           Impression and Plan:  Karolina Vogt is a 59 y.o. female with   1. Primary localized osteoarthritis of right knee      Discussed treatment options-- chose cortisone injection today  DIagnosis, evaluation, and treatment options were explained to patient in detail and questions answered.   Home exercises were explained and included if appropriate.  Further treatment as discussed.  See Patient Instructions for more details of what was provided to patient with further information on diagnosis, evaluation, and treatment.     FOLLOW UP:  pending response to injection   Call Pediatric [facility] Medicine Office [phone] if not improving as expected or any further concern.      ** Please excuse any errors in grammar or translation related to this dictation. Voice recognition software was utilized to prepare this document. **

## 2024-10-02 DIAGNOSIS — G43.109 MIGRAINE WITH VISUAL AURA: ICD-10-CM

## 2024-10-02 RX ORDER — UBROGEPANT 100 MG/1
100 TABLET ORAL AS NEEDED
Qty: 16 TABLET | Refills: 6 | Status: SHIPPED | OUTPATIENT
Start: 2024-10-02

## 2024-10-02 NOTE — TELEPHONE ENCOUNTER
PT last seen 4/5/24 and was instructed to F/U in a year.  Has appointment  already scheduled for 4/4/25.

## 2024-10-09 ENCOUNTER — OFFICE VISIT (OUTPATIENT)
Dept: PRIMARY CARE | Facility: CLINIC | Age: 59
End: 2024-10-09
Payer: COMMERCIAL

## 2024-10-09 ENCOUNTER — TELEPHONE (OUTPATIENT)
Dept: PRIMARY CARE | Facility: CLINIC | Age: 59
End: 2024-10-09

## 2024-10-09 ENCOUNTER — HOSPITAL ENCOUNTER (OUTPATIENT)
Dept: RADIOLOGY | Facility: CLINIC | Age: 59
Discharge: HOME | End: 2024-10-09
Payer: COMMERCIAL

## 2024-10-09 VITALS
HEART RATE: 92 BPM | DIASTOLIC BLOOD PRESSURE: 72 MMHG | SYSTOLIC BLOOD PRESSURE: 118 MMHG | OXYGEN SATURATION: 96 % | RESPIRATION RATE: 16 BRPM | BODY MASS INDEX: 32.43 KG/M2 | TEMPERATURE: 97.3 F | HEIGHT: 63 IN | WEIGHT: 183 LBS

## 2024-10-09 DIAGNOSIS — S80.01XA CONTUSION OF RIGHT KNEE, INITIAL ENCOUNTER: Primary | ICD-10-CM

## 2024-10-09 DIAGNOSIS — S80.01XA CONTUSION OF RIGHT KNEE, INITIAL ENCOUNTER: ICD-10-CM

## 2024-10-09 PROCEDURE — 99214 OFFICE O/P EST MOD 30 MIN: CPT | Performed by: FAMILY MEDICINE

## 2024-10-09 PROCEDURE — 73564 X-RAY EXAM KNEE 4 OR MORE: CPT | Mod: RIGHT SIDE | Performed by: RADIOLOGY

## 2024-10-09 PROCEDURE — 73564 X-RAY EXAM KNEE 4 OR MORE: CPT | Mod: RT

## 2024-10-09 PROCEDURE — 3008F BODY MASS INDEX DOCD: CPT | Performed by: FAMILY MEDICINE

## 2024-10-09 ASSESSMENT — ENCOUNTER SYMPTOMS
CONSTITUTIONAL NEGATIVE: 1
NEUROLOGICAL NEGATIVE: 1
JOINT SWELLING: 1
GASTROINTESTINAL NEGATIVE: 1
ARTHRALGIAS: 1
PSYCHIATRIC NEGATIVE: 1
RESPIRATORY NEGATIVE: 1
CARDIOVASCULAR NEGATIVE: 1

## 2024-10-09 NOTE — PROGRESS NOTES
"Subjective   Patient ID: Karolina Vogt is a 59 y.o. female who presents for Fall.    Patient history of right knee pain.  However she just had an injection on Monday.  However patient fell twice since then and landed on her right knee.  She tripped fell onto the outer aspect of her right knee.  Again yesterday same thing.  Does not feel weaker.  She has happened to fall on it.  Very tender in the lateral aspect, some swelling some bruising.  No numbness tingling, no loss of consciousness no other injuries.  Just pain in the right knee    Fall         Review of Systems   Constitutional: Negative.    Respiratory: Negative.     Cardiovascular: Negative.    Gastrointestinal: Negative.    Musculoskeletal:  Positive for arthralgias and joint swelling.   Neurological: Negative.    Psychiatric/Behavioral: Negative.         Objective   /72 (BP Location: Left arm, Patient Position: Sitting, BP Cuff Size: Adult)   Pulse 92   Temp 36.3 °C (97.3 °F)   Resp 16   Ht 1.6 m (5' 3\")   Wt 83 kg (183 lb)   LMP 01/01/2010   SpO2 96%   BMI 32.42 kg/m²     Physical Exam  Constitutional:       General: She is not in acute distress.     Appearance: Normal appearance. She is well-developed.   Cardiovascular:      Rate and Rhythm: Normal rate and regular rhythm.   Pulmonary:      Effort: Pulmonary effort is normal.      Breath sounds: Normal breath sounds.   Musculoskeletal:         General: Tenderness (Patient has tenderness in the right lower lateral condyle/tibial plateau.  There is some bruising, range of motion is intact but tender.  No ligament or meniscal signs) present.   Skin:     Findings: No rash.   Neurological:      Mental Status: She is alert.   Psychiatric:         Mood and Affect: Mood normal.         Behavior: Behavior normal.         Assessment/Plan   Problem List Items Addressed This Visit    None  Visit Diagnoses       Contusion of right knee, initial encounter    -  Primary    Relevant Orders    XR knee " right 4+ views          Patient already sees orthopedic she will follow-up with them.  She will try Voltaren gel ice.  If any increase swelling pain worsening symptoms numbness tingling go to ER  Follow-up in 2 weeks

## 2024-12-02 ENCOUNTER — TELEPHONE (OUTPATIENT)
Dept: PULMONOLOGY | Facility: CLINIC | Age: 59
End: 2024-12-02
Payer: COMMERCIAL

## 2024-12-02 NOTE — TELEPHONE ENCOUNTER
Pt called and stated that she got a letter from [employer] and pt needs a letter mailed to her stating her diagnosis, limited ability to work and how long you have been her doctor.    Please advise.

## 2025-01-23 DIAGNOSIS — K21.9 GASTROESOPHAGEAL REFLUX DISEASE WITHOUT ESOPHAGITIS: ICD-10-CM

## 2025-01-23 RX ORDER — DEXLANSOPRAZOLE 60 MG/1
1 CAPSULE, DELAYED RELEASE ORAL DAILY
Qty: 30 CAPSULE | Refills: 3 | Status: SHIPPED | OUTPATIENT
Start: 2025-01-23

## 2025-01-24 ENCOUNTER — HOSPITAL ENCOUNTER (EMERGENCY)
Facility: HOSPITAL | Age: 60
Discharge: HOME | End: 2025-01-24
Attending: EMERGENCY MEDICINE
Payer: COMMERCIAL

## 2025-01-24 ENCOUNTER — APPOINTMENT (OUTPATIENT)
Dept: RADIOLOGY | Facility: HOSPITAL | Age: 60
End: 2025-01-24
Payer: COMMERCIAL

## 2025-01-24 ENCOUNTER — APPOINTMENT (OUTPATIENT)
Dept: CARDIOLOGY | Facility: HOSPITAL | Age: 60
End: 2025-01-24
Payer: COMMERCIAL

## 2025-01-24 VITALS
WEIGHT: 185 LBS | HEART RATE: 92 BPM | TEMPERATURE: 97 F | DIASTOLIC BLOOD PRESSURE: 88 MMHG | HEIGHT: 66 IN | SYSTOLIC BLOOD PRESSURE: 138 MMHG | OXYGEN SATURATION: 94 % | BODY MASS INDEX: 29.73 KG/M2 | RESPIRATION RATE: 24 BRPM

## 2025-01-24 DIAGNOSIS — F10.920 ALCOHOLIC INTOXICATION WITHOUT COMPLICATION (CMS-HCC): Primary | ICD-10-CM

## 2025-01-24 LAB
ALBUMIN SERPL BCP-MCNC: 4.5 G/DL (ref 3.4–5)
ALP SERPL-CCNC: 85 U/L (ref 33–110)
ALT SERPL W P-5'-P-CCNC: 26 U/L (ref 7–45)
AMPHETAMINES UR QL SCN: ABNORMAL
ANION GAP SERPL CALC-SCNC: 17 MMOL/L (ref 10–20)
APAP SERPL-MCNC: <10 UG/ML
AST SERPL W P-5'-P-CCNC: 23 U/L (ref 9–39)
BARBITURATES UR QL SCN: ABNORMAL
BASOPHILS # BLD AUTO: 0.02 X10*3/UL (ref 0–0.1)
BASOPHILS NFR BLD AUTO: 0.2 %
BENZODIAZ UR QL SCN: ABNORMAL
BILIRUB SERPL-MCNC: 0.3 MG/DL (ref 0–1.2)
BUN SERPL-MCNC: 13 MG/DL (ref 6–23)
BZE UR QL SCN: ABNORMAL
CALCIUM SERPL-MCNC: 9.2 MG/DL (ref 8.6–10.3)
CANNABINOIDS UR QL SCN: ABNORMAL
CHLORIDE SERPL-SCNC: 98 MMOL/L (ref 98–107)
CO2 SERPL-SCNC: 26 MMOL/L (ref 21–32)
CREAT SERPL-MCNC: 0.73 MG/DL (ref 0.5–1.05)
EGFRCR SERPLBLD CKD-EPI 2021: >90 ML/MIN/1.73M*2
EOSINOPHIL # BLD AUTO: 0.05 X10*3/UL (ref 0–0.7)
EOSINOPHIL NFR BLD AUTO: 0.5 %
ERYTHROCYTE [DISTWIDTH] IN BLOOD BY AUTOMATED COUNT: 16.3 % (ref 11.5–14.5)
ETHANOL SERPL-MCNC: 267 MG/DL
FENTANYL+NORFENTANYL UR QL SCN: ABNORMAL
GLUCOSE SERPL-MCNC: 92 MG/DL (ref 74–99)
HCT VFR BLD AUTO: 43.8 % (ref 36–46)
HGB BLD-MCNC: 13.7 G/DL (ref 12–16)
HOLD SPECIMEN: NORMAL
HOLD SPECIMEN: NORMAL
IMM GRANULOCYTES # BLD AUTO: 0.03 X10*3/UL (ref 0–0.7)
IMM GRANULOCYTES NFR BLD AUTO: 0.3 % (ref 0–0.9)
LYMPHOCYTES # BLD AUTO: 1.15 X10*3/UL (ref 1.2–4.8)
LYMPHOCYTES NFR BLD AUTO: 12.4 %
MCH RBC QN AUTO: 28.5 PG (ref 26–34)
MCHC RBC AUTO-ENTMCNC: 31.3 G/DL (ref 32–36)
MCV RBC AUTO: 91 FL (ref 80–100)
METHADONE UR QL SCN: ABNORMAL
MONOCYTES # BLD AUTO: 0.23 X10*3/UL (ref 0.1–1)
MONOCYTES NFR BLD AUTO: 2.5 %
NEUTROPHILS # BLD AUTO: 7.83 X10*3/UL (ref 1.2–7.7)
NEUTROPHILS NFR BLD AUTO: 84.1 %
NRBC BLD-RTO: 0 /100 WBCS (ref 0–0)
OPIATES UR QL SCN: ABNORMAL
OXYCODONE+OXYMORPHONE UR QL SCN: ABNORMAL
PCP UR QL SCN: ABNORMAL
PLATELET # BLD AUTO: 339 X10*3/UL (ref 150–450)
POTASSIUM SERPL-SCNC: 4.4 MMOL/L (ref 3.5–5.3)
PROT SERPL-MCNC: 7.4 G/DL (ref 6.4–8.2)
RBC # BLD AUTO: 4.81 X10*6/UL (ref 4–5.2)
SALICYLATES SERPL-MCNC: <3 MG/DL
SODIUM SERPL-SCNC: 137 MMOL/L (ref 136–145)
WBC # BLD AUTO: 9.3 X10*3/UL (ref 4.4–11.3)

## 2025-01-24 PROCEDURE — 84075 ASSAY ALKALINE PHOSPHATASE: CPT | Performed by: EMERGENCY MEDICINE

## 2025-01-24 PROCEDURE — 93005 ELECTROCARDIOGRAM TRACING: CPT

## 2025-01-24 PROCEDURE — 99285 EMERGENCY DEPT VISIT HI MDM: CPT | Performed by: EMERGENCY MEDICINE

## 2025-01-24 PROCEDURE — 72125 CT NECK SPINE W/O DYE: CPT | Performed by: RADIOLOGY

## 2025-01-24 PROCEDURE — 80307 DRUG TEST PRSMV CHEM ANLYZR: CPT | Performed by: EMERGENCY MEDICINE

## 2025-01-24 PROCEDURE — 36415 COLL VENOUS BLD VENIPUNCTURE: CPT | Performed by: EMERGENCY MEDICINE

## 2025-01-24 PROCEDURE — 80320 DRUG SCREEN QUANTALCOHOLS: CPT | Performed by: EMERGENCY MEDICINE

## 2025-01-24 PROCEDURE — 70450 CT HEAD/BRAIN W/O DYE: CPT

## 2025-01-24 PROCEDURE — 71045 X-RAY EXAM CHEST 1 VIEW: CPT

## 2025-01-24 PROCEDURE — 72125 CT NECK SPINE W/O DYE: CPT

## 2025-01-24 PROCEDURE — 99285 EMERGENCY DEPT VISIT HI MDM: CPT | Mod: 25 | Performed by: EMERGENCY MEDICINE

## 2025-01-24 PROCEDURE — 70450 CT HEAD/BRAIN W/O DYE: CPT | Performed by: RADIOLOGY

## 2025-01-24 PROCEDURE — 85025 COMPLETE CBC W/AUTO DIFF WBC: CPT | Performed by: EMERGENCY MEDICINE

## 2025-01-24 PROCEDURE — 71045 X-RAY EXAM CHEST 1 VIEW: CPT | Performed by: RADIOLOGY

## 2025-01-24 ASSESSMENT — LIFESTYLE VARIABLES
HAVE PEOPLE ANNOYED YOU BY CRITICIZING YOUR DRINKING: NO
HAVE YOU EVER FELT YOU SHOULD CUT DOWN ON YOUR DRINKING: NO
TOTAL SCORE: 0
EVER FELT BAD OR GUILTY ABOUT YOUR DRINKING: NO
EVER HAD A DRINK FIRST THING IN THE MORNING TO STEADY YOUR NERVES TO GET RID OF A HANGOVER: NO

## 2025-01-24 ASSESSMENT — COLUMBIA-SUICIDE SEVERITY RATING SCALE - C-SSRS
1. IN THE PAST MONTH, HAVE YOU WISHED YOU WERE DEAD OR WISHED YOU COULD GO TO SLEEP AND NOT WAKE UP?: NO
6. HAVE YOU EVER DONE ANYTHING, STARTED TO DO ANYTHING, OR PREPARED TO DO ANYTHING TO END YOUR LIFE?: NO
2. HAVE YOU ACTUALLY HAD ANY THOUGHTS OF KILLING YOURSELF?: NO

## 2025-01-24 ASSESSMENT — PAIN - FUNCTIONAL ASSESSMENT
PAIN_FUNCTIONAL_ASSESSMENT: 0-10
PAIN_FUNCTIONAL_ASSESSMENT: 0-10

## 2025-01-24 ASSESSMENT — PAIN SCALES - GENERAL
PAINLEVEL_OUTOF10: 0 - NO PAIN

## 2025-01-24 NOTE — ED PROVIDER NOTES
EMERGENCY DEPARTMENT ENCOUNTER      Pt Name: Karolina Vogt  MRN: [id number]  Birthdate 1965  Date of evaluation: 1/24/2025  Provider: Nacho Vora DO    CHIEF COMPLAINT       Chief Complaint   Patient presents with    Altered Mental Status     Per EMS patient was found on floor unresponsive but arousable. The patient's roommate states that the patient was drinking vodka, took xanax and amitriptyline. Patient arrived to ED and is slow to respond, but answers questions appropriately.         HISTORY OF PRESENT ILLNESS    Patient is a 60-year-old female presenting today via EMS after she was found at home to be intoxicated by a friend and they were concerned that she may have had a fall and they are concerned about her mental status.  Patient reports that she was drinking today, thinks that she had around 2 cocktails.  She also reports that she took some Xanax, she is unable to tell me how much.  There was also report from EMS that the patient may have taken amitriptyline today as well.  Patient denies taking amitriptyline when I asked her directly.  Otherwise she does appear to be intoxicated and is not a reliable historian.      History provided by:  EMS personnel  History limited by:  Mental status change   used: No        Nursing Notes were reviewed.    PAST MEDICAL HISTORY     Past Medical History:   Diagnosis Date    Chronic obstructive pulmonary disease with (acute) exacerbation (Multi) 10/12/2020    COPD exacerbation    Chronic obstructive pulmonary disease with (acute) exacerbation (Multi) 10/12/2020    COPD exacerbation    Chronic obstructive pulmonary disease with (acute) exacerbation (Multi) 10/12/2020    COPD exacerbation    Other specified postprocedural states     H/O ovarian cystectomy    Personal history of other endocrine, nutritional and metabolic disease     History of alpha-1-antitrypsin deficiency    Personal history of other specified conditions     History of dyspnea          SURGICAL HISTORY       Past Surgical History:   Procedure Laterality Date    FOOT SURGERY  10/20/2014    Foot Surgery    KNEE ARTHROSCOPY W/ DEBRIDEMENT  10/20/2014    Knee Arthroscopy (Therapeutic)         CURRENT MEDICATIONS       Discharge Medication List as of 1/24/2025  9:59 PM        CONTINUE these medications which have NOT CHANGED    Details   albuterol 0.63 mg/3 mL nebulizer solution Inhale 3 mL every 4 hours., Starting Mon 11/22/2021, Historical Med      ALPRAZolam (Xanax) 1 mg tablet Take 1 tablet (1 mg) by mouth 2 times a day., Historical Med      clomiPRAMINE (Anafranil) 50 mg capsule Take 2 capsules (100 mg) by mouth once daily at bedtime., Starting Sat 3/30/2024, Historical Med      cyanocobalamin (Vitamin B-12) 500 mcg tablet Take 1 tablet (500 mcg) by mouth once daily., Historical Med      dexlansoprazole (Dexilant) 60 mg DR capsule Take 1 capsule (60 mg) by mouth once daily., Starting Thu 1/23/2025, Normal      diclofenac sodium (Voltaren) 1 % gel Apply 4.5 inches (4 g) topically 4 times a day as needed (left knee pain)., Starting Wed 3/27/2024, Until Thu 3/27/2025 at 2359, Normal      haloperidol (Haldol) 5 mg tablet Take 1 tablet (5 mg) by mouth once daily., Historical Med      levothyroxine (Synthroid, Levoxyl) 50 mcg tablet Take 1 tablet (50 mcg) by mouth once daily., Starting Wed 8/28/2024, Normal      prazosin (Minipress) 5 mg capsule Historical Med      roflumilast (Daliresp) 500 mcg tablet Take 1 tablet (500 mcg) by mouth once daily., Starting Wed 3/13/2024, Until Thu 3/13/2025, Normal      simvastatin (Zocor) 20 mg tablet Take 1 tablet (20 mg) by mouth once daily., Starting Wed 8/28/2024, Normal      Trelegy Ellipta 100-62.5-25 mcg blister with device Inhale 1 puff once daily., Starting Fri 8/16/2024, Until Sat 8/16/2025, Normal      trihexyphenidyl (Artane) 5 mg tablet Take by mouth., Starting Mon 9/18/2017, Historical Med      Ubrelvy 100 mg tablet tablet Take 1 tablet (100 mg)  by mouth if needed (migraine)., Starting Wed 10/2/2024, Normal             ALLERGIES     Nsaids (non-steroidal anti-inflammatory drug), Bupropion, Caffeine, Cephalosporins, Diphenhydramine, Erythromycin, Gabapentin, Pramipexole, Tramadol, Trazodone, and Azithromycin    FAMILY HISTORY     No family history on file.       SOCIAL HISTORY       Social History     Socioeconomic History    Marital status:    Tobacco Use    Smoking status: Every Day     Current packs/day: 0.50     Types: Cigarettes    Smokeless tobacco: Never   Vaping Use    Vaping status: Never Used   Substance and Sexual Activity    Alcohol use: Yes     Comment: daily 1 cocktail    Drug use: Not Currently     Types: Marijuana       SCREENINGS                        PHYSICAL EXAM    (up to 7 for level 4, 8 or more for level 5)     ED Triage Vitals   Temp Pulse Resp BP   -- -- -- --      SpO2 Temp src Heart Rate Source Patient Position   -- -- -- --      BP Location FiO2 (%)     -- --       Physical Exam  Vitals and nursing note reviewed.   Constitutional:       General: She is not in acute distress.     Appearance: She is not ill-appearing.   HENT:      Head: Normocephalic and atraumatic.      Right Ear: External ear normal.      Left Ear: External ear normal.      Nose: Nose normal.      Mouth/Throat:      Comments: Oropharynx normal without obvious signs of trauma.  Eyes:      General:         Right eye: No discharge.         Left eye: No discharge.      Pupils: Pupils are equal, round, and reactive to light.   Neck:      Comments: C-collar in place  Cardiovascular:      Rate and Rhythm: Tachycardia present.      Pulses: Normal pulses.      Heart sounds: No murmur heard.  Pulmonary:      Effort: No respiratory distress.   Abdominal:      General: There is no distension.      Palpations: Abdomen is soft.      Tenderness: There is no abdominal tenderness.   Musculoskeletal:         General: No signs of injury.      Cervical back: No tenderness.    Skin:     Capillary Refill: Capillary refill takes less than 2 seconds.      Coloration: Skin is not jaundiced or pale.      Findings: No bruising or lesion.          DIAGNOSTIC RESULTS     LABS:  Labs Reviewed   CBC WITH AUTO DIFFERENTIAL - Abnormal       Result Value    WBC 9.3      nRBC 0.0      RBC 4.81      Hemoglobin 13.7      Hematocrit 43.8      MCV 91      MCH 28.5      MCHC 31.3 (*)     RDW 16.3 (*)     Platelets 339      Neutrophils % 84.1      Immature Granulocytes %, Automated 0.3      Lymphocytes % 12.4      Monocytes % 2.5      Eosinophils % 0.5      Basophils % 0.2      Neutrophils Absolute 7.83 (*)     Immature Granulocytes Absolute, Automated 0.03      Lymphocytes Absolute 1.15 (*)     Monocytes Absolute 0.23      Eosinophils Absolute 0.05      Basophils Absolute 0.02     DRUG SCREEN,URINE - Abnormal    Amphetamine Screen, Urine Presumptive Negative      Barbiturate Screen, Urine Presumptive Negative      Benzodiazepines Screen, Urine Presumptive Positive (*)     Cannabinoid Screen, Urine Presumptive Negative      Cocaine Metabolite Screen, Urine Presumptive Negative      Fentanyl Screen, Urine Presumptive Negative      Opiate Screen, Urine Presumptive Negative      Oxycodone Screen, Urine Presumptive Negative      PCP Screen, Urine Presumptive Negative      Methadone Screen, Urine Presumptive Negative      Narrative:     Drug screen results are presumptive and should not be used to assess   compliance with prescribed medication. Contact the performing [facility] laboratory   to add-on definitive confirmatory testing if clinically indicated.    Toxicology screening results are reported qualitatively. The concentration must   be greater than or equal to the cutoff to be reported as positive. The concentration   at which the screening test can detect an individual drug or metabolite varies.   The absence of expected drug(s) and/or drug metabolite(s) may indicate non-compliance,   inappropriate timing of  specimen collection relative to drug administration, poor drug   absorption, diluted/adulterated urine, or limitations of testing. For medical purposes   only; not valid for forensic use.    Interpretive questions should be directed to the laboratory medical directors.   ACUTE TOXICOLOGY PANEL, BLOOD - Abnormal    Acetaminophen <10.0      Salicylate  <3      Alcohol 267 (*)    COMPREHENSIVE METABOLIC PANEL - Normal    Glucose 92      Sodium 137      Potassium 4.4      Chloride 98      Bicarbonate 26      Anion Gap 17      Urea Nitrogen 13      Creatinine 0.73      eGFR >90      Calcium 9.2      Albumin 4.5      Alkaline Phosphatase 85      Total Protein 7.4      AST 23      Bilirubin, Total 0.3      ALT 26         All other labs were within normal range or not returned as of this dictation.    Imaging  XR chest 1 view   Final Result   No acute cardiopulmonary disease.        Signed by: Ebenezer Dwyer 1/24/2025 4:22 PM   Dictation workstation:   [id number]      CT cervical spine wo IV contrast   Final Result   1. No acute fracture or spondylolisthesis.   2. Chronic degenerative changes as described in the body of the   report.             Signed by: Ebenezer Dwyer 1/24/2025 4:55 PM   Dictation workstation:   [id number]      CT head wo IV contrast   Final Result   No CT evidence for acute intracranial pathology.        Signed by: Ebenezer Dwyer 1/24/2025 4:14 PM   Dictation workstation:   [id number]           Procedures  Please see separate procedure documentation for further details.     EMERGENCY DEPARTMENT COURSE/MDM:   Medical Decision Making  60-year-old female presenting today via EMS with concern for intoxication and somewhat altered mental status.  Patient was found to be acting abnormally by a friend at her apartment.  Patient admits to drinking multiple alcoholic beverages, also states that she took Xanax, is unable to tell me how much.  Patient does appear to be intoxicated clinically on my  exam.  She does admit to taking Xanax as well as drinking alcohol today.  She denies any other medicines or ingestions today.        Please see ED course for additional MDM.    ED Course as of 01/25/25 1108   Fri Jan 24, 2025   1646 Patient was signed out to the oncoming resident pending sober reevaluation [CL]   2126 On reevaluation, patient is awake and alert.  She is not slurring her speech.  She states she just had too much to drink earlier today.  She denies having any thoughts of harming herself, suicidal ideations or homicidal ideations.  Patient states she drinks a few times a week.  She does not feel like she needs help with this at this time.  Patient is able to tolerate p.o. intake.  She is able to ambulate without assistance.  Patient's roommate is en route to pick the patient up at which time the patient will be discharged into her care. [PS]   2155 EKG: Sinus rhythm at 77 bpm.  .  QRS 90.  QTc 42.  Normal axis.  No ST elevations, no acute ischemic pattern. [PS]   2159 Sober ride is here and the patient is discharged her care. [PS]      ED Course User Index  [CL] Nacho Vora DO  [PS] Murtaza Givens DO         Diagnoses as of 01/25/25 1108   Alcoholic intoxication without complication (CMS-[facility])        XR chest 1 view   Final Result   No acute cardiopulmonary disease.        Signed by: Ebenezer Dwyer 1/24/2025 4:22 PM   Dictation workstation:   [id number]      CT cervical spine wo IV contrast   Final Result   1. No acute fracture or spondylolisthesis.   2. Chronic degenerative changes as described in the body of the   report.             Signed by: Ebenezer Dwyer 1/24/2025 4:55 PM   Dictation workstation:   [id number]      CT head wo IV contrast   Final Result   No CT evidence for acute intracranial pathology.        Signed by: Ebenezer Dwyer 1/24/2025 4:14 PM   Dictation workstation:   [id number]          Patient and or family in agreement and understanding of treatment plan.  All  questions answered.      I reviewed the case with the attending ED physician. The attending ED physician agrees with the plan. Patient and/or patient´s representative was counseled regarding labs, imaging, likely diagnosis, and plan. All questions were answered.    ED Medications administered this visit:  Medications - No data to display    New Prescriptions from this visit:    Discharge Medication List as of 1/24/2025  9:59 PM          Follow-up:  Jason Ayala,   [id number]  [facility], [address]  [facility] [address]  [phone]    Schedule an appointment as soon as possible for a visit           Final Impression:   1. Alcoholic intoxication without complication (CMS-HCC)          (Please note that portions of this note were completed with a voice recognition program.  Efforts were made to edit the dictations but occasionally words are mis-transcribed.)     Nacho Vora DO  Resident  01/24/25 1646       Nacho Vora DO  Resident  01/25/25 1108

## 2025-01-25 NOTE — DISCHARGE INSTRUCTIONS
Follow-up with your primary care physician.  Seek immediate medical attention with any worsening symptoms, any fevers, chills, chest pain, shortness of breath.  Seek immediate medical attention should you determine need help with your drinking.

## 2025-02-12 DIAGNOSIS — J44.9 CHRONIC OBSTRUCTIVE PULMONARY DISEASE, UNSPECIFIED COPD TYPE (MULTI): ICD-10-CM

## 2025-02-12 DIAGNOSIS — J41.8 MIXED SIMPLE AND MUCOPURULENT CHRONIC BRONCHITIS (MULTI): ICD-10-CM

## 2025-02-12 RX ORDER — FLUTICASONE FUROATE, UMECLIDINIUM BROMIDE AND VILANTEROL TRIFENATATE 100; 62.5; 25 UG/1; UG/1; UG/1
1 POWDER RESPIRATORY (INHALATION) DAILY
Qty: 90 EACH | Refills: 3 | Status: SHIPPED | OUTPATIENT
Start: 2025-02-12 | End: 2026-02-12

## 2025-02-12 NOTE — TELEPHONE ENCOUNTER
Patient called requesting RF on Trelegy be [facility].    Rx pended below.    Please review.

## 2025-02-28 ENCOUNTER — TELEPHONE (OUTPATIENT)
Facility: CLINIC | Age: 60
End: 2025-02-28
Payer: COMMERCIAL

## 2025-02-28 DIAGNOSIS — J44.9 CHRONIC OBSTRUCTIVE PULMONARY DISEASE, UNSPECIFIED COPD TYPE (MULTI): Primary | ICD-10-CM

## 2025-02-28 RX ORDER — ALBUTEROL SULFATE 90 UG/1
2 INHALANT RESPIRATORY (INHALATION) EVERY 4 HOURS PRN
Qty: 18 G | Refills: 3 | Status: SHIPPED | OUTPATIENT
Start: 2025-02-28

## 2025-02-28 NOTE — TELEPHONE ENCOUNTER
Patient called in asking for a refill on Albuterol inhaler and she's out. She needs it today as she's having issues because not feeling well. Can you please refill this for her. Thanks        yes

## 2025-03-03 ENCOUNTER — TELEPHONE (OUTPATIENT)
Facility: CLINIC | Age: 60
End: 2025-03-03
Payer: COMMERCIAL

## 2025-03-03 DIAGNOSIS — B37.0 ORAL THRUSH: Primary | ICD-10-CM

## 2025-03-03 RX ORDER — NYSTATIN 100000 [USP'U]/ML
500000 SUSPENSION ORAL 4 TIMES DAILY
Qty: 200 ML | Refills: 0 | Status: SHIPPED | OUTPATIENT
Start: 2025-03-03 | End: 2025-03-13

## 2025-03-03 NOTE — TELEPHONE ENCOUNTER
Patient called in stating that she has thrush and asking if you can send in something for her. Thanks

## 2025-03-11 DIAGNOSIS — E78.2 MIXED HYPERLIPIDEMIA: ICD-10-CM

## 2025-03-11 RX ORDER — SIMVASTATIN 20 MG/1
20 TABLET, FILM COATED ORAL DAILY
Qty: 90 TABLET | Refills: 1 | Status: SHIPPED | OUTPATIENT
Start: 2025-03-11

## 2025-03-17 ENCOUNTER — OFFICE VISIT (OUTPATIENT)
Dept: OTOLARYNGOLOGY | Facility: CLINIC | Age: 60
End: 2025-03-17
Payer: COMMERCIAL

## 2025-03-17 VITALS
BODY MASS INDEX: 33.69 KG/M2 | TEMPERATURE: 95.9 F | SYSTOLIC BLOOD PRESSURE: 122 MMHG | WEIGHT: 197.31 LBS | HEIGHT: 64 IN | DIASTOLIC BLOOD PRESSURE: 82 MMHG

## 2025-03-17 DIAGNOSIS — Z78.9 RECURRENT RESPIRATORY PAPILLOMATOSIS: ICD-10-CM

## 2025-03-17 DIAGNOSIS — J38.7 LEUKOPLAKIA OF LARYNX: ICD-10-CM

## 2025-03-17 DIAGNOSIS — R09.89 CHRONIC THROAT CLEARING: ICD-10-CM

## 2025-03-17 DIAGNOSIS — R06.81 WITNESSED EPISODE OF APNEA: ICD-10-CM

## 2025-03-17 DIAGNOSIS — J34.89 NASAL MASS: ICD-10-CM

## 2025-03-17 DIAGNOSIS — R09.A2 GLOBUS PHARYNGEUS: Primary | ICD-10-CM

## 2025-03-17 PROCEDURE — 99214 OFFICE O/P EST MOD 30 MIN: CPT | Mod: 25 | Performed by: OTOLARYNGOLOGY

## 2025-03-17 PROCEDURE — 3008F BODY MASS INDEX DOCD: CPT | Performed by: OTOLARYNGOLOGY

## 2025-03-17 PROCEDURE — 31575 DIAGNOSTIC LARYNGOSCOPY: CPT | Performed by: OTOLARYNGOLOGY

## 2025-03-17 PROCEDURE — 99214 OFFICE O/P EST MOD 30 MIN: CPT | Performed by: OTOLARYNGOLOGY

## 2025-03-17 NOTE — PROGRESS NOTES
"ASSESSMENT AND PLAN:   Karolina Vogt is a 60 y.o. female with a history of SOB that occurs when laying supine.  She has noticed this a few times a day. Her friend notes this at night when snoring. It is unclear if there any apneas. The friend also snores.    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 She is smoking and we discussed smoking cessation.  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 This is mall and is not causing issues.    We discussed PSG to determine if she has epiglottic collapse, as this appears to be a component of her complaint. If there is not BRANDEE on that study we will consider DISE.      Reason For Consult  No chief complaint on file.       HISTORY OF PRESENT ILLNESS:  Karolina Vogt is a 60 y.o. female presenting for a follow up visit with me for SOB/\"throat closing\" sensation.      The patient reports symptoms when laying supine. She has noted symptoms with exertion. She is snoring at night.        Prior History:   NPV: \"Throat closing\"  ref by Dr. Tilley.     Over the last several months she has noticed an issue where she will breathe in and feel that her throat is closing and then she coughs and it opens up and she returns to her baseline. This can happen when laying on her back or upright. It happens multiple times per day. There is no specific trigger. She does have a history of COPD. Outside of these events she denies issues with swallowing.         Past Medical History  She has a past medical history of Chronic obstructive pulmonary disease with (acute) exacerbation (Multi) (10/12/2020), Chronic obstructive pulmonary disease with (acute) exacerbation (Multi) (10/12/2020), Chronic obstructive pulmonary disease with (acute) exacerbation (Multi) (10/12/2020), Other specified postprocedural states, Personal history of other endocrine, nutritional and metabolic disease, and Personal history of other specified conditions. Surgical " History  She has a past surgical history that includes Foot surgery (10/20/2014) and Knee arthroscopy w/ debridement (10/20/2014).   Social History  She reports that she has been smoking cigarettes. She has never used smokeless tobacco. She reports current alcohol use. She reports that she does not currently use drugs after having used the following drugs: Marijuana. Allergies  Nsaids (non-steroidal anti-inflammatory drug), Bupropion, Caffeine, Cephalosporins, Diphenhydramine, Erythromycin, Gabapentin, Pramipexole, Tramadol, Trazodone, and Azithromycin     Family History  No family history on file.    Review of Systems  All 10 systems were reviewed and negative except for above.      Last Recorded Vitals  Last menstrual period 01/01/2010, not currently breastfeeding.    Physical Exam  ENT Physical Exam  Constitutional  Appearance: patient appears well-developed and well-nourished,  Head and Face  Appearance: head appears normal and face appears normal;  Ear  Auricles: right auricle normal; left auricle normal;  Nose  External Nose: nares patent bilaterally;  Oral Cavity/Oropharynx  Lips: normal;  Neck  Neck: neck normal; neck palpation normal;  Respiratory  Inspection: no retractions visible;  Cardiovascular  Inspection: no peripheral edema present;  Neurovestibular  Mental Status: alert and oriented;  Psychiatric: mood normal;  Cranial Nerves: cranial nerves intact;     Procedures   Flexible Laryngoscopy w/ Videostroboscopy    VOICE AND SPEECH CHARACTERISTICS:  Normal spoken speech, (+) mild dysphonia, (+) mild roughness, no breathiness, no asthenia, (+) mild strain.    Intelligibility: normal.   Resonance: balanced.   Vocal Loudness: normal.   Breath Support: normal.    PROCEDURE:    Indications: difficulty breathing  PROCEDURE NOTE: FLEXIBLE LARYNGOSCOPY WITH STROBOSCOPY  I recommended a flexible laryngoscopy with stroboscopy based on PE findings, and/or concern for mucosal wave details based upon history and/or  for issues associated with hyperreflexic gag on mirror exam concerning for pathology. Risks, benefits, and alternatives were explained. The patient wishes to proceed and gives verbal consent.   Patient is seated in the exam chair. After adequate topical anesthesia, I advance the flexible endoscope. The examination included evaluation of the strickland, vallecula, base of tongue, pyriforms, post-cricoid area, larynx and immediate subglottis.  Findings : assessment of the nasopharynx, base of tongue/vallecula, pyriform sinuses, post-cricoid area and pharyngeal walls was without lesion or mass, pharyngeal wall contraction is normal and symmetric, and no pooling of secretions  subglottic edema:2 (present), ventricular obliteration: 2 (present), erythema/hyperemia: 2 (arytenoids), and IA thickenin (mild)  Gross Arytenoid Movement: symmetric.  Arytenoid Height: normal.   Supraglottic Tension: none.  Mass: Mild leukoplakia of the bilateral TVC. RRP lesion in the left anterior nare.  Closure: closed.      Time Spent  Prep time on day of patient encounter: 10 minutes  Time spent directly with patient, family or caregiver: 15 minutes  Additional Time Spent on Patient Care Activities/Discussion with SLP re care plan: 5 minutes  Documentation Time: 10 minutes  Other Time Spent: 0 minutes  Total: 40 minutes     Scribe Attestation  By signing my name below, Yari DAY , Scribe attest that this documentation has been prepared under the direction and in the presence of Bert Chauhan MD.

## 2025-03-21 ENCOUNTER — CLINICAL SUPPORT (OUTPATIENT)
Dept: SLEEP MEDICINE | Facility: CLINIC | Age: 60
End: 2025-03-21
Payer: COMMERCIAL

## 2025-03-21 VITALS — WEIGHT: 197.31 LBS | BODY MASS INDEX: 33.69 KG/M2 | HEIGHT: 64 IN

## 2025-03-21 DIAGNOSIS — R06.81 WITNESSED EPISODE OF APNEA: ICD-10-CM

## 2025-03-21 NOTE — PROGRESS NOTES
[facility] TECH NOTE:     Patient: Karolina Vogt   MRN//AGE: [id number]  1965  60 y.o.   Technologist: Jostin Peters   Room: 1   Service Date: 3/21/2025        Sleep Testing Location: [facility]    [place]: 2    TECHNOLOGIST SLEEP STUDY PROCEDURE NOTE:   This sleep study is being conducted according to the policies and procedures outlined by the [employer] accreditation standards.  The sleep study procedure and processes involved during this appointment was explained to the patient/patient’s family, questions were answered. The patient/family verbalized understanding.      The patient is a 60 y.o. year old female scheduled for a Diagnostic PSG Split night with montage of:  PSG . she arrived for her appointment.      The study that was ultimately completed was a  Diagnostic PSG  with montage of:  PSG .    The full study Was completed.  Patient questionnaires completed?: yes     Consents signed? yes    Initial Fall Risk Screening:     Karolina has not fallen in the last 6 months. Karolina does not have a fear of falling. She does not need assistance with sitting, standing, or walking. she does not need assistance walking in her home. she does not need assistance in an unfamiliar setting. The patient is notusing an assistive device.     Brief Study observations: Pt presents as 61 y/o Female here for scheduled PSG/ Split.  Noted no override orders on record.   Study Observations:  Pt arrived on time, was alone, was ambulatory w/o assistance, and appeared alert/ oriented throughout interactions w/ sleep staff.  Throughout intervention window noted AHI < 5, accompanied by frequent arousals, frequent awakenings, frequent leg movements, moderate occasional snoring, periodic WASO, and fragmented sleep architecture.  Noted no abnormal behaviors throughout duration of study.    Noted persistent hypoxemia during recording accompanied by coughing spasms unabated by Pt's own self administered Albuterol treatments.  Study  Interventions:  Per Split Night protocol, did not administer CPAP therapy due to AHI < 15 observed during intervention window and at least 120 mins of recorded sleep.  Oxygen therapy was administered @ 1lpm via Nasal Cannula for persistent hypoxemia despite Pt self administering x1 treatments of own Albuterol inhaler.  Additional Notes:  None Noted.     Deviation to order/protocol and reason: N/A      If PAP, which was preferred mask/pressure/mode: N/A      Other:None    After the procedure, the patient/family was informed to ensure followup with ordering clinician for testing results.      Technologist: Jostin Peters

## 2025-04-04 ENCOUNTER — APPOINTMENT (OUTPATIENT)
Dept: NEUROLOGY | Facility: CLINIC | Age: 60
End: 2025-04-04
Payer: COMMERCIAL

## 2025-04-24 ENCOUNTER — APPOINTMENT (OUTPATIENT)
Dept: OTOLARYNGOLOGY | Facility: CLINIC | Age: 60
End: 2025-04-24
Payer: COMMERCIAL

## 2025-04-24 DIAGNOSIS — G47.33 OSA (OBSTRUCTIVE SLEEP APNEA): Primary | ICD-10-CM

## 2025-04-24 DIAGNOSIS — J38.7 LEUKOPLAKIA OF LARYNX: ICD-10-CM

## 2025-04-24 DIAGNOSIS — R49.8 OTHER VOICE AND RESONANCE DISORDERS: ICD-10-CM

## 2025-04-24 NOTE — PROGRESS NOTES
"ASSESSMENT AND PLAN:   Karolina Vogt is a 60 y.o. female with a history of significant smoking, and reports that she stopped smoking in the last 5 day.  Minimal changes seen on exam today.   She has leukoplakia and she will follow up in 6 months.    She has reduction of O2 levels at night despite low levels of BRANDEE. I am concerned about this and recommended that she follow up with her pulmonologist. She has O2 at home and doesn't use it.        Reason For Consult  Chief Complaint   Patient presents with    Follow-up     FUV on sleep lab         HISTORY OF PRESENT ILLNESS:  Karolina Vogt is a 60 y.o. female presenting for a follow up visit with me for review of sleep study.  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 She is smoking and we discussed smoking cessation at last visit.  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 This is small and is not causing issues. Minimal RDI changes but with significant O2 desaturations. T88 182 minutes.      The patient reports that she has not smoked in 5 days  Prior History:   Seen 3/17/25:  referred for \"Throat closing\"  by Dr. Tilley.      RDI3% was 5.9/hr, RDI4% was 2.0/hr  The mean oxygen saturation was 91.0% during wake and 89.0% during sleep.   The oxygen saturation was <= 88% for 182.6 minutes. The SpO2 abdifatah was 80.0%.   Oxygenation is borderline even during wakefulness.    Has O2 available.        Over the last several months she has noticed an issue where she will breathe in and feel that her throat is closing and then she coughs and it opens up and she returns to her baseline. This can happen when laying on her back or upright. It happens multiple times per day. There is no specific trigger. She does have a history of COPD. Outside of these events she denies issues with swallowing.     Past Medical History  She has a past medical history of Chronic obstructive pulmonary disease with (acute) exacerbation (Multi) (10/12/2020), Chronic " obstructive pulmonary disease with (acute) exacerbation (Multi) (10/12/2020), Chronic obstructive pulmonary disease with (acute) exacerbation (Multi) (10/12/2020), Other specified postprocedural states, Personal history of other endocrine, nutritional and metabolic disease, and Personal history of other specified conditions. Surgical History  She has a past surgical history that includes Foot surgery (10/20/2014) and Knee arthroscopy w/ debridement (10/20/2014).   Social History  She reports that she has been smoking cigarettes. She has never been exposed to tobacco smoke. She has never used smokeless tobacco. She reports current alcohol use. She reports that she does not currently use drugs after having used the following drugs: Marijuana. Allergies  Nsaids (non-steroidal anti-inflammatory drug), Bupropion, Caffeine, Cephalosporins, Diphenhydramine, Erythromycin, Gabapentin, Pramipexole, Tramadol, Trazodone, and Azithromycin     Family History  Family History[1]    Review of Systems  All 10 systems were reviewed and negative except for above.      Last Recorded Vitals  Last menstrual period 01/01/2010, not currently breastfeeding.      Time Spent  Prep time on day of patient encounter: 10 minutes  Time spent directly with patient, family or caregiver: 15 minutes  Additional Time Spent on Patient Care Activities/Discussion with SLP re care plan: 5 minutes  Documentation Time: 10 minutes  Other Time Spent: 0 minutes  Total: 40 minutes     Scribe Attestation  By signing my name below, Yari DAY , Scribromie attest that this documentation has been prepared under the direction and in the presence of Bert Chauhan MD.           [1] No family history on file.

## 2025-06-16 ENCOUNTER — TELEPHONE (OUTPATIENT)
Facility: CLINIC | Age: 60
End: 2025-06-16
Payer: COMMERCIAL

## 2025-06-16 NOTE — TELEPHONE ENCOUNTER
Karolina called in saying that since Saturday she has had the following symptoms and would like antibiotics/prednisone.     Low grade fever  Cough  Headache  Green/yellow phlegm  Congestion      Please advise

## 2025-06-18 DIAGNOSIS — J41.8 MIXED SIMPLE AND MUCOPURULENT CHRONIC BRONCHITIS (MULTI): Primary | ICD-10-CM

## 2025-06-18 RX ORDER — DOXYCYCLINE HYCLATE 100 MG
100 TABLET ORAL 2 TIMES DAILY
Qty: 20 TABLET | Refills: 0 | Status: SHIPPED | OUTPATIENT
Start: 2025-06-18 | End: 2025-06-28

## 2025-06-18 RX ORDER — PREDNISONE 10 MG/1
TABLET ORAL
Qty: 30 TABLET | Refills: 0 | Status: SHIPPED | OUTPATIENT
Start: 2025-06-18 | End: 2025-07-18

## 2025-06-24 ENCOUNTER — APPOINTMENT (OUTPATIENT)
Facility: CLINIC | Age: 60
End: 2025-06-24
Payer: COMMERCIAL

## 2025-06-24 VITALS
BODY MASS INDEX: 35.51 KG/M2 | OXYGEN SATURATION: 88 % | DIASTOLIC BLOOD PRESSURE: 99 MMHG | HEIGHT: 64 IN | HEART RATE: 91 BPM | RESPIRATION RATE: 18 BRPM | SYSTOLIC BLOOD PRESSURE: 149 MMHG | WEIGHT: 208 LBS

## 2025-06-24 DIAGNOSIS — E78.2 MIXED HYPERLIPIDEMIA: ICD-10-CM

## 2025-06-24 DIAGNOSIS — F17.218 CIGARETTE NICOTINE DEPENDENCE WITH OTHER NICOTINE-INDUCED DISORDER: Primary | ICD-10-CM

## 2025-06-24 DIAGNOSIS — K21.9 GASTROESOPHAGEAL REFLUX DISEASE WITHOUT ESOPHAGITIS: ICD-10-CM

## 2025-06-24 DIAGNOSIS — J96.11 CHRONIC RESPIRATORY FAILURE WITH HYPOXIA: ICD-10-CM

## 2025-06-24 DIAGNOSIS — J44.9 CHRONIC OBSTRUCTIVE PULMONARY DISEASE, UNSPECIFIED COPD TYPE (MULTI): ICD-10-CM

## 2025-06-24 DIAGNOSIS — J41.8 MIXED SIMPLE AND MUCOPURULENT CHRONIC BRONCHITIS (MULTI): ICD-10-CM

## 2025-06-24 PROCEDURE — 3008F BODY MASS INDEX DOCD: CPT | Performed by: INTERNAL MEDICINE

## 2025-06-24 PROCEDURE — G2211 COMPLEX E/M VISIT ADD ON: HCPCS | Performed by: INTERNAL MEDICINE

## 2025-06-24 PROCEDURE — 99214 OFFICE O/P EST MOD 30 MIN: CPT | Performed by: INTERNAL MEDICINE

## 2025-06-24 PROCEDURE — G8433 SCR FOR DEP NOT CPT DOC RSN: HCPCS | Performed by: INTERNAL MEDICINE

## 2025-06-24 RX ORDER — ALBUTEROL SULFATE 0.83 MG/ML
3 SOLUTION RESPIRATORY (INHALATION) ONCE
OUTPATIENT
Start: 2025-06-24 | End: 2025-06-24

## 2025-06-24 RX ORDER — FLUTICASONE FUROATE, UMECLIDINIUM BROMIDE AND VILANTEROL TRIFENATATE 100; 62.5; 25 UG/1; UG/1; UG/1
1 POWDER RESPIRATORY (INHALATION) DAILY
Qty: 90 EACH | Refills: 3 | Status: SHIPPED | OUTPATIENT
Start: 2025-06-24 | End: 2026-06-24

## 2025-06-24 RX ORDER — PREDNISONE 10 MG/1
TABLET ORAL
Qty: 40 TABLET | Refills: 0 | Status: SHIPPED | OUTPATIENT
Start: 2025-06-24 | End: 2025-07-24

## 2025-06-24 RX ORDER — DEXLANSOPRAZOLE 60 MG/1
1 CAPSULE, DELAYED RELEASE ORAL DAILY
Qty: 30 CAPSULE | Refills: 3 | Status: SHIPPED | OUTPATIENT
Start: 2025-06-24

## 2025-06-24 RX ORDER — ALBUTEROL SULFATE 90 UG/1
1 INHALANT RESPIRATORY (INHALATION) ONCE
OUTPATIENT
Start: 2025-06-24

## 2025-06-24 RX ORDER — ROFLUMILAST 500 UG/1
500 TABLET ORAL DAILY
Qty: 30 TABLET | Refills: 6 | Status: SHIPPED | OUTPATIENT
Start: 2025-06-24

## 2025-06-24 RX ORDER — ALBUTEROL SULFATE 90 UG/1
2 INHALANT RESPIRATORY (INHALATION) EVERY 4 HOURS PRN
Qty: 54 G | Refills: 3 | Status: SHIPPED | OUTPATIENT
Start: 2025-06-24

## 2025-06-24 ASSESSMENT — COPD QUESTIONNAIRES
QUESTION3_CHESTTIGHTNESS: 4
QUESTION5_HOMEACTIVITIES: 5 - I AM VERY LIMITED DOING ACTIVITIES AT HOME
QUESTION7_SLEEPQUALITY: 0 - I SLEEP SOUNDLY
QUESTION2_CHESTPHLEGM: 5 - MY CHEST IS COMPLETELY FULL OF PHLEGM (MUCUS)
CAT_TOTALSCORE: 29
QUESTION1_COUGHFREQUENCY: 5 - I COUGH ALL THE TIME
QUESTION4_WALKINCLINE: 5 - WHEN I WALK UP A HILL OR ONE FLIGHT OF STAIRS I AM VERY BREATHLESS
QUESTION8_ENERGYLEVEL: 3
QUESTION6_LEAVINGHOUSE: 2

## 2025-06-24 ASSESSMENT — ENCOUNTER SYMPTOMS
LOSS OF SENSATION IN FEET: 0
DEPRESSION: 0
OCCASIONAL FEELINGS OF UNSTEADINESS: 0

## 2025-06-24 ASSESSMENT — PATIENT HEALTH QUESTIONNAIRE - PHQ9
SUM OF ALL RESPONSES TO PHQ9 QUESTIONS 1 AND 2: 0
2. FEELING DOWN, DEPRESSED OR HOPELESS: NOT AT ALL
1. LITTLE INTEREST OR PLEASURE IN DOING THINGS: NOT AT ALL

## 2025-06-24 NOTE — PROGRESS NOTES
Department of Medicine  Division of Pulmonary, Critical Care, and Sleep Medicine  Follow-Up Visit  [facility] - [address], [address]    Physician HPI:  Ms. Vogt is a 59-year-old female with past medical history of COPD who presented to the office today to follow-up. She is to follow-up with Dr. Boyd in the past. The patient has more than 30-pack-year history of cigarette smoking. She is a current smoker of less than than half a pack per day now. She reports increased chest congestion and cough over the past few weeks. Her symptoms were associated with increased shortness of breath at home and intermittent lightheadedness. No syncope reported. Upon presentation to the clinic today her O2 saturation was noted to be 82% on room air. She did not look to be in acute respiratory distress. She has expiratory wheezes with frequent cough during exam. Supplemental oxygen was applied and her O2 saturation improved to 92% on 3 liters per minute nasal cannula. The patient has no supplemental oxygen at home before and has not had any recent admission due to COPD exacerbation. Given her acute hypoxemic respiratory failure and possible acute respiratory infection/COPD exacerbation, I recommended to go to the ER for further assessment and treatment. I will follow-up with her shortly after discharge.     Follow up 1/20/2020:  Karolina reports to feel better since her recent discharge from the hospital. She was admitted with COPD exacerbation and acute hypoxemia. CXR did not reveal acute air-space disease. She was treated with systemic corticosteroids. She denies recurrent fevers or purulent sputum production. She has been using Albuterol as needed. She does not have a nebulizer machine at home.      Follow up 2/19/2020:  Karolina reports to be doing well now from the respiratory standpoint. She feels Trelegy has been helpful. She has to use Albuterol about once or twice per day. She denies recurrent fevers or purulent sputum now. No  acute chest pain. No orthopnea or LE edema. No recent syncopes.      Follow up 03/19/2020;  Karolina reports to feel better with Trelegy. SHe has cut down on cigarette smoking but has not stopped smoking yet. No acute cough or fevers today. She has not done PFTs yet.      Follow up 6/17/2020:  No acute respiratory symptoms today. Feels better with Trelegy. Still smoking 1/2 PPD.      Follow up 10/12/2020:  Karolina reports overall stable respiratory status since last visit. She has occasional wheezing. She is using trilogy every day. Unfortunately she continues to smoke about half a pack of cigarettes every day. No interval history of COPD exacerbation reported.     Follow-up 6/16/2022:  Karolina presented to the office today to follow-up after recent hospital admission due to acute copd exacerbation. She is feeling better since discharge though she has not started Trelegy inhaler yet as she ran out. She has been only using albuterol as needed. She admitted to continue to smoke about half a pack of cigarettes per day. No acute fevers since discharge or purulent sputum.     Follow-up 3/13/2023:  Karolina was recently started on a course of prednisone and antibiotic for acute bronchitis and COPD exacerbation. She reports to feel slightly better though she continues to have cough and wheezing with minimal exertion at home. She has been using supplemental oxygen as needed. She reports her O2 saturation would drop to 84% with activities on room air. Denies acute fevers or hemoptysis. No acute chest pain. She continues to smoke about half a pack of cigarettes every day.     Follow up 05/22/2024:  Karolina presented to the office today for follow up. Denies acute respiratory symptoms today. Using Trelegy every day. No recent hx of acute COPD exacerbation. Continue to smoker cigarettes daily (< 1/2 ppd).    Follow up 06/24/2025:  Recovering from recent acute exacerbation of COPD. Has not completed Prednisone course yet.   Continue to  smoker less than 1 pack per day. She does not feel ready yet to quit.   Using ICS/LABA/LAMA daily       Immunization History   Administered Date(s) Administered    COVID-19, mRNA, LNP-S, PF, 30 mcg/0.3 mL dose 03/25/2021, 04/13/2021, 11/27/2021    Hep A / Hep B 11/01/2023    Pfizer COVID-19 vaccine, bivalent, age 12 years and older (30 mcg/0.3 mL) 09/22/2022    Tdap vaccine, age 7 year and older (BOOSTRIX, ADACEL) 04/13/2024       Current Outpatient Medications   Medication Instructions    albuterol (Ventolin HFA) 90 mcg/actuation inhaler 2 puffs, inhalation, Every 4 hours PRN    albuterol 0.63 mg/3 mL nebulizer solution 3 mL, Every 4 hours RT    ALPRAZolam (XANAX) 1 mg, 2 times daily    clomiPRAMINE (ANAFRANIL) 100 mg, Nightly    cyanocobalamin (Vitamin B-12) 500 mcg tablet 1 tablet, Daily    dexlansoprazole (DEXILANT) 60 mg, oral, Daily    doxycycline (VIBRA-TABS) 100 mg, oral, 2 times daily, Take with a full glass of water and do not lie down for at least 30 minutes after.    haloperidol (HALDOL) 5 mg, Daily    levothyroxine (SYNTHROID, LEVOXYL) 50 mcg, oral, Daily    prazosin (Minipress) 5 mg capsule     predniSONE (Deltasone) 10 mg tablet Take 4 tabs (40mg) daily for 3 days, then 3 tabs (30mg) daily for 3 days,  2 tabs (20mg) daily for 3 days,  1 tab (10 mg) daily for 3 days.    predniSONE (Deltasone) 10 mg tablet Take 4 tabs (40mg) daily for 4 days, then 3 tabs (30mg) daily for 4 days,  2 tabs (20mg) daily for 4 days,  1 tab (10 mg) daily for 4 days.    roflumilast (DALIRESP) 500 mcg, oral, Daily    simvastatin (ZOCOR) 20 mg, oral, Daily    Trelegy Ellipta 100-62.5-25 mcg blister with device 1 puff, inhalation, Daily    trihexyphenidyl (Artane) 5 mg tablet Take by mouth.    Ubrelvy 100 mg, oral, As needed        Allergies:  Allergies   Allergen Reactions    Nsaids (Non-Steroidal Anti-Inflammatory Drug) Nausea/vomiting    Bupropion Seizure     seizures with wellbutrin    Caffeine Unknown     vomitting     "Cephalosporins Nausea Only and Nausea/vomiting    Diphenhydramine Seizure    Erythromycin Nausea Only    Gabapentin Unknown    Pramipexole Unknown    Tramadol Nausea Only and Other     PT REPORTS DOES NOT MIXX WELL WITH PSYCH MEDS    Trazodone Unknown     night terrors    Azithromycin Nausea Only and Rash          Visit Vitals  BP (!) 149/99   Pulse 91   Resp 18   Ht 1.626 m (5' 4\")   Wt 94.3 kg (208 lb)   LMP 01/01/2010   SpO2 (!) 88%   BMI 35.70 kg/m²   OB Status Postmenopausal   Smoking Status Every Day   BSA 2.06 m²        Physical Exam  Constitutional: General appearance: Abnormal.  chronically ill and within normal limits of ideal weight.   Pulmonary: Chest: Normal to inspection.  Respiratory effort: No increased work of breathing or signs of respiratory distress.  Auscultation of lungs: Abnormal.  Expiratory wheezes bilaterally.   Cardiovascular: Heart rate and rhythm were normal, normal S1 and S2, no gallops, no murmurs and no pericardial rub. No peripheral edema.   Musculoskeletal: No joint swelling seen, normal movements of all extremities.   Skin: Normal skin color and pigmentation, normal skin turgor, and no rash.   Psychiatric: Judgment and insight: Intact. Alert and oriented to person, place and time. Mood and affect: Normal.        Chest Radiograph     XR chest 1 view 04/16/2024    Narrative  Interpreted By:  Larry Marshall,  STUDY:  XR CHEST 1 VIEW;  4/16/2024 3:32 am    INDICATION:  Signs/Symptoms:Chest Pain.    COMPARISON:  12/2022    ACCESSION NUMBER(S):  [id number]    ORDERING CLINICIAN:  ABDULLAHI JURADO    FINDINGS:          CARDIOMEDIASTINAL SILHOUETTE:  Cardiomediastinal silhouette is normal in size and configuration.    LUNGS:  Lungs are clear.    ABDOMEN:  No remarkable upper abdominal findings.    BONES:  No acute osseous changes.    Impression  1.  No evidence of acute cardiopulmonary process.  No developing  infiltrate        MACRO:  None    Signed by: Larry Marshall 4/16/2024 3:44 " AM  Dictation workstation:   [id number]      XR chest 2 view 12/14/2022    Narrative  MRN: [id number]  Patient Name: MICHAEL FALL    STUDY:  TH CHEST 2 VIEW PA AND LAT;    INDICATION:  shortness of breath  J44.1: COPD exacerbation.    COMPARISON:  Chest radiograph 11/14/2022 and CT chest 05/15/2022.    ACCESSION NUMBER(S):  [id number]    ORDERING CLINICIAN:  STEVEN VIERA    FINDINGS:  The cardiac silhouette size is within normal limits.  There is no focal consolidation, edema or pneumothorax.  No sizeable pleural effusion.    Impression  1. No acute cardiopulmonary process.      Echocardiogram     No results found for this or any previous visit from the past 365 days.       Chest CT Scan     CT CHEST PULMONARY EMBOLISM W IV CONTRAST 05/15/2022    Narrative  MRN: [id number]  Patient Name: MICHAEL FALL    STUDY:  CT ANGIO CHEST FOR PE;  5/15/2022 1:09 pm    INDICATION:  SOB .    COMPARISON:  None    ACCESSION NUMBER(S):  [id number]    ORDERING CLINICIAN:  ELIAZAR TRAN    TECHNIQUE:  Helical data acquisition of the chest was obtained after intravenous  administration of 30 milliliterISOVUE 370, as per PE protocol. Images  were reformatted in coronal and sagittal planes. Axial and coronal  maximum intensity projection (MIP) images were created and reviewed.    FINDINGS:  Bronchial thickening detected. Bolus timing limits sensitivity. No  evidence of central pulmonary embolism. No thoracic aneurysm or  dissection. No pericardial effusion. No pneumonia or evidence of  pulmonary edema. Mild fatty infiltration of the liver. No suspicious  osseous lesions    Impression  1. Bolus timing limits sensitivity. No evidence of central pulmonary  embolism. Distal vessels not well interrogated bronchial thickening  detected. No features of pneumonia or pulmonary edema       Laboratory Studies     Lab Results   Component Value Date    WBC 9.3 01/24/2025    HGB 13.7 01/24/2025    HCT 43.8 01/24/2025    MCV 91 01/24/2025      "01/24/2025      Lab Results   Component Value Date    GLUCOSE 92 01/24/2025    CALCIUM 9.2 01/24/2025     01/24/2025    K 4.4 01/24/2025    CO2 26 01/24/2025    CL 98 01/24/2025    BUN 13 01/24/2025    CREATININE 0.73 01/24/2025      Lab Results   Component Value Date    ALT 26 01/24/2025    AST 23 01/24/2025    ALKPHOS 85 01/24/2025    BILITOT 0.3 01/24/2025        Protime   Date/Time Value Ref Range Status   04/16/2024 03:16 AM 10.1 9.8 - 12.8 seconds Final     INR   Date/Time Value Ref Range Status   04/16/2024 03:16 AM 0.9 0.9 - 1.1 Final       No results found for: \"ICIGE\", \"IGE\", \"ICA04\", \"ASPFU\", \"IGG\", \"IGA\", \"IGM\"      Pulmonary Function Test             Assessment and Plan / Recommendations     1. COPD with acute exacerbation   2. Nicotine dependence  3. Chronic hypoxic respiratory failure - on home O2 (not compliant)     I reviewed her most recent CT scan of chest (July 2023). No suspicious nodules seen at that time.      Recommend:  1. Continue on ICS/LABA/LAMA  2. Added Roflumilast today  3. Use Albuterol/Ipratropium every 4 hours as needed.   4. Smoking cessation counselling is done again today. She does not feel quite ready yet to quit.   5. Use supplemental o2 at night time and as needed during the day to keep PSO2 > 90%.   6. Repeat PFTs  7. LDCT for lung ca screening      RTC in 3 months to follow up.     Please excuse any misspellings or unintended errors related to the Dragon speech recognition software used to dictate this note.     Abigail Wagner MD  06/24/2025  "

## 2025-06-27 ENCOUNTER — TELEPHONE (OUTPATIENT)
Dept: PRIMARY CARE | Facility: CLINIC | Age: 60
End: 2025-06-27
Payer: COMMERCIAL

## 2025-06-27 DIAGNOSIS — K21.9 GASTROESOPHAGEAL REFLUX DISEASE WITHOUT ESOPHAGITIS: Primary | ICD-10-CM

## 2025-06-27 RX ORDER — OMEPRAZOLE 40 MG/1
40 CAPSULE, DELAYED RELEASE ORAL
Qty: 30 CAPSULE | Refills: 2 | Status: SHIPPED | OUTPATIENT
Start: 2025-06-27 | End: 2025-07-27

## 2025-06-27 NOTE — TELEPHONE ENCOUNTER
Patient called and wanted to know if you can change the Dexilant 60 mg to another medication because her insurance will not cover.

## 2025-07-02 ENCOUNTER — TELEPHONE (OUTPATIENT)
Facility: CLINIC | Age: 60
End: 2025-07-02
Payer: COMMERCIAL

## 2025-07-02 NOTE — TELEPHONE ENCOUNTER
Patient called stating her concentrator broke I called back and her room mate answered stating she is going to need and order for a oxygen concentrator since the one she was using was not hers and had to be returned to her room mates DME. I called [facility] they stated Karolina does have oxygen concentrator and she has never called to get it repaired. I called Karolina back she stated they picked it up when she moved and never returned it to her new home.

## 2025-07-07 ENCOUNTER — OFFICE VISIT (OUTPATIENT)
Dept: OTOLARYNGOLOGY | Facility: CLINIC | Age: 60
End: 2025-07-07
Payer: COMMERCIAL

## 2025-07-07 VITALS
HEIGHT: 64 IN | DIASTOLIC BLOOD PRESSURE: 75 MMHG | SYSTOLIC BLOOD PRESSURE: 109 MMHG | BODY MASS INDEX: 34.63 KG/M2 | TEMPERATURE: 96.8 F | WEIGHT: 202.82 LBS

## 2025-07-07 DIAGNOSIS — R49.8 VOICE FATIGUE: Primary | ICD-10-CM

## 2025-07-07 DIAGNOSIS — R49.8 OTHER VOICE AND RESONANCE DISORDERS: ICD-10-CM

## 2025-07-07 DIAGNOSIS — J38.7 LEUKOPLAKIA OF LARYNX: ICD-10-CM

## 2025-07-07 DIAGNOSIS — R49.0 VOICE HOARSENESS: ICD-10-CM

## 2025-07-07 DIAGNOSIS — R09.A2 GLOBUS PHARYNGEUS: ICD-10-CM

## 2025-07-07 DIAGNOSIS — R49.0 MUSCLE TENSION DYSPHONIA: ICD-10-CM

## 2025-07-07 DIAGNOSIS — R09.89 CHRONIC THROAT CLEARING: ICD-10-CM

## 2025-07-07 PROCEDURE — 31575 DIAGNOSTIC LARYNGOSCOPY: CPT | Performed by: OTOLARYNGOLOGY

## 2025-07-07 PROCEDURE — 99214 OFFICE O/P EST MOD 30 MIN: CPT | Mod: 25 | Performed by: OTOLARYNGOLOGY

## 2025-07-07 PROCEDURE — 3008F BODY MASS INDEX DOCD: CPT | Performed by: OTOLARYNGOLOGY

## 2025-07-07 PROCEDURE — 99214 OFFICE O/P EST MOD 30 MIN: CPT | Performed by: OTOLARYNGOLOGY

## 2025-07-07 NOTE — PROGRESS NOTES
ASSESSMENT AND PLAN:   Karolina Votg is a 60 y.o. female with a history of low O2 status due to COPD. She was seen by Pulmonary Medicine, and is currently not using O2 a recommended. We discussed this with the patient and recommended that she uses a concentrator.     Today's examination to include flexible laryngoscopy demonstrates minimal leukoplakia that is thin in appearance. This is not concerning at this point.    We discussed annual follow up unless there is change in status of breathing. She has a sensation of closure in the back of the throat, primarily with exhalation. however, there were no brodie lesions or anatomical basis for this. She has mild weakness of the palate.  This could be a pulmonary manifestation and recommended O2.      Assessment & Plan  The patient presents with a history of COPD, vocal cord leukoplakia, and throat closure sensation. The patient has a history of low oxygen status due to COPD and is not using prescribed oxygen. Minimal leukoplakia was observed on laryngoscopy, with no significant concern currently. The throat closure sensation is primarily on exhalation, with no anatomic basis found (no mass, lesion, or palate weakness), and is likely a pulmonary manifestation.     We discussed options for management to include the importance of oxygen use for improving energy and reducing cardiac strain, and a trial of an oxygen concentrator during activities to improve symptoms and well-being. Annual follow-up was recommended for vocal cord leukoplakia unless voice or breathing changes occur.     Treatment plan:   - Trial of oxygen concentrator for COPD and throat closure sensation.   - Annual follow-up for vocal cord leukoplakia unless voice or breathing changes occur.     Clinical decision making: Discussed risks, benefits, and alternatives of oxygen use, emphasizing its role in improving energy and reducing cardiac strain.     Follow-up: 1 year.    PROCEDURE  Procedure  "Performed  Flexible laryngoscopy    Technique  Pre-procedure: Risks, benefits, and side effects (e.g., minor irritation) discussed. Verbal consent obtained.  Post-procedure: Patient tolerated well. Advised increased hydration for dry mucus.      Reason For Consult  No chief complaint on file.       HISTORY OF PRESENT ILLNESS:  Karolina Vogt is a 60 y.o. female presenting for a follow up visit with me for globus sensation.      The patient reports a persistent sensation of \"flapping in the throat\". She has noticed that with exertion, she has a laryngospasm. She ahs noted nasal obstruction and is unable to breathe out of her nose. She is not using her supplemental O2, recently received a concentrator. She has anxiety around supplemental O2 use. She only uses her nebulizer and rescue inhaler. She is saturating in the 80's.       Prior History:   Hx of leukoplakia - last seen 4/24/25    At last visit - she \"stopped smoking\"    Has desats at night despite relatively modest BRANDEE #s:     RDI3% was 5.9/hr, RDI4% was 2.0/hr  The mean oxygen saturation was 91.0% during wake and 89.0% during sleep.   The oxygen saturation was <= 88% for 182.6 minutes. The SpO2 abdifatah was 80.0%.   Oxygenation is borderline even during wakefulness.    Has O2 available - does not use it    Rec'd follow up with pulmonology.  Seen by Dr. Easton Wagner on 6/24/25.      1. COPD with acute exacerbation   2. Nicotine dependence  3. Chronic hypoxic respiratory failure - on home O2 (not compliant)    Recent pulm visit: increased chest congestion and cough over the past few weeks.  increased shortness of breath at home and intermittent lightheadedness. No syncope reported. Upon presentation to the clinic today her O2 saturation was noted to be 82% on room air.  Supplemental oxygen was applied and her O2 saturation improved to 92% on 3 liters per minute nasal cannula.  Recommended to go to the ER for further assessment and treatment.    Seen 3/17/25:  " "\"Throat closing\"  ref by KR    Over the last several months she has noticed an issue where she will breathe in and feel that her throat is closing and then she coughs and it opens up and she returns to her baseline. This can happen when laying on her back or upright. It happens multiple times per day. There is no specific trigger. She does have a history of COPD. Outside of these events she denies issues with swallowing.    Hx of COPD GOLD III w/ MMRCII and Nicotine dependence  -Today feels like breathing is at her baseline, but O2 sat initially 84%; she states that is ranges from mid 60s to mid 90s at home.   -She is uses her rescue inhaler 1-2 times a month and denies any recent illnesses/fevers or chills  -Ambulatory pulse ox ranged from 89%-92%, and then at rest she was 94 before ambulation  -Encouraged her to use her supplemental oxygen when her O2 levels get below 88 and while asleep  -ED precautions discussed and importance of follow up with her pulmonologist reinforced    History of Present Illness  The patient is a 60-year-old female with a history of leukoplakia of the vocal cords, COPD, nicotine dependence, and chronic hypoxic respiratory failure. She was last seen on April 24, 2025, and recently saw her pulmonologist on June 24.    Respiratory Issues  - Baseline oxygen levels are usually in the 80s, with the lowest recorded at 80% during a sleep study.  - Even when awake, her oxygen levels are borderline.  - She has used up to 3 L/min of oxygen through a nasal cannula in the past.  - Reports feeling like her throat is flapping down, making it hard to breathe, both at home and while shopping.  - At night, she has trouble breathing through her nose.  - Does not use her oxygen during the day or night but uses her nebulizer and inhaler as needed.  - Pulmonologist noted that her low oxygen levels improve with supplemental oxygen, but she feels she doesn't need it and is monitored by a nurse.  - Does not have " sleep apnea but does experience oxygen desaturation.  - Physical activities like house cleaning cause her to sweat and need to rest, but she has not tried using oxygen during these activities.  - Recently got an oxygen concentrator but avoids using it because she is afraid of becoming dependent on it.    Supplemental information: None.         Past Medical History  She has a past medical history of Chronic obstructive pulmonary disease with (acute) exacerbation (Multi) (10/12/2020), Chronic obstructive pulmonary disease with (acute) exacerbation (Multi) (10/12/2020), Chronic obstructive pulmonary disease with (acute) exacerbation (Multi) (10/12/2020), Other specified postprocedural states, Personal history of other endocrine, nutritional and metabolic disease, and Personal history of other specified conditions. Surgical History  She has a past surgical history that includes Foot surgery (10/20/2014) and Knee arthroscopy w/ debridement (10/20/2014).   Social History  She reports that she has been smoking cigarettes. She has never been exposed to tobacco smoke. She has never used smokeless tobacco. She reports current alcohol use. She reports that she does not currently use drugs after having used the following drugs: Marijuana. Allergies  Nsaids (non-steroidal anti-inflammatory drug), Bupropion, Caffeine, Cephalosporins, Diphenhydramine, Erythromycin, Gabapentin, Pramipexole, Tramadol, Trazodone, and Azithromycin     Family History  Family History[1]    Review of Systems  All 10 systems were reviewed and negative except for above.      Last Recorded Vitals  Last menstrual period 01/01/2010, not currently breastfeeding.    Physical Exam  ENT Physical Exam  Constitutional  Appearance: patient appears well-developed and well-nourished,  Head and Face  Appearance: head appears normal and face appears normal;  Ear  Auricles: right auricle normal; left auricle normal;  Nose  External Nose: nares patent bilaterally;  Oral  Cavity/Oropharynx  Lips: normal;  Neck  Neck: neck normal; neck palpation normal;  Respiratory  Inspection: no retractions visible;  Cardiovascular  Inspection: no peripheral edema present;  Neurovestibular  Mental Status: alert and oriented;  Psychiatric: mood normal;  Cranial Nerves: cranial nerves intact;     Physical Exam  **PHYSICAL EXAMINATION:**    **Appearance:** Patient appears well-developed, well-nourished, no acute distress.  **Head/Face:** Symmetric facial features, no masses/lesions.  **Salivary Glands:** Parotid/submandibular glands normal bilaterally.  **Ears:** Right auricle normal; left auricle normal.  **External/Internal Nose:** Straight septum.  **Oral Cavity:** Dry mucus in posterior throat.  **Pharynx:** No tonsillar hypertrophy/erythema.  **Neck Palpation:** No lymphadenopathy, trachea midline, thyroid non-enlarged.  **Respiratory:** Breathing comfortably, no stridor/retractions.  **CV/Extremities:** No clubbing/cyanosis/peripheral edema.  **Cranial Nerves:** II-XII grossly intact and symmetric.  **Mood/Affect:** Appropriate, no agitation.    **STROBOSCOPY:**    **VOICE AND SPEECH CHARACTERISTICS:**  Normal spoken speech.  **Grade:** Normal - 0.  **Roughness:** Absent - 0.  **Breathiness:** Absent - 0.  **Asthenia:** Absent - 0.  **Strain:** Absent - 0.    **Additional Voice Characteristics:**  **Pitch:** Within normal range.  **Intelligibility:** Normal.  **Resonance:** Balanced.  **Vocal Loudness:** Adequate.  **Breath Support:** Sustained.    **Reflux Finding Score:**  **Subglottic edema:** Present.  **Thick Mucus:** Present.  **Granuloma:** Absent.  **Ventricular Obliteration:** Partial.  **Erythema/Hyperemia:** Absent.  **IA Thickening:** Mild.  **TVC Edema:** Absent.  **Diffuse Laryngitis:** Absent.    **PROCEDURE:**  **Indications:** Per HPI.  **Technique:** Patient seated, topical anesthesia applied; flexible endoscope advanced to evaluate nasopharynx, oropharynx, hypopharynx, larynx,  and subglottis.    **Gross Arytenoid Movement:** Symmetric.  **Height:** Normal height.  **Supraglottic Tension:** Normal.  Strobe not performed:  err  **Symmetry:** Symmetric vibration.  **Amplitude:** Normal.  **Closure:** Closed glottis.  **Mucosal Wave Lateral Excursion/Secondary Wave:** Full bilaterally.  **Periodicity:** Regular.  **Closure:** Complete.    Results           Procedures     Flexible Laryngoscopy w/ Videostroboscopy    VOICE AND SPEECH CHARACTERISTICS:  Normal spoken speech, (+) mild dysphonia, (+) mild roughness, (+) mild breathiness, (+) mild asthenia, (+) mild strain.    Intelligibility: normal.   Resonance: balanced.   Vocal Loudness: normal.   Breath Support: decreased.    PROCEDURE:    Indications: difficulty breathing  Procedure Note  Post-operative Diagnosis: same    Anesthesia: Lidocaine 2% and Iván-Synephrine 1/2%    Endoscopy Type:  Flexible Laryngoscopy    Procedure Details:    The patient was placed in the sitting position.  After topical anesthesia and decongestion, the 4 mm laryngoscope was passed.  The nasal cavities, nasopharynx, oropharynx, hypopharynx, and larynx were all examined.  Vocal cords were examined during respiration and phonation.  The following findings were noted:    Condition:  Stable.  Patient tolerated procedure well.    Complications:  None  Patient is seated in the exam chair. After adequate topical anesthesia, I advance the flexible endoscope. The examination included evaluation of the strickland, vallecula, base of tongue, pyriforms, post-cricoid area, larynx and immediate subglottis.  Findings : assessment of the nasopharynx, base of tongue/vallecula, pyriform sinuses, post-cricoid area and pharyngeal walls was without lesion or mass, pharyngeal wall contraction is normal and symmetric, and no pooling of secretions  subglottic edema:2 (present), thick mucous: 2 (present), ventricular obliteration: 2 (present), and IA thickenin (mild)  Gross Arytenoid Movement:  symmetric.  Arytenoid Height: normal.   Supraglottic Tension: none.  Closure: closed.      Time Spent  Prep time on day of patient encounter: 10 minutes  Time spent directly with patient, family or caregiver: 15 minutes  Additional Time Spent on Patient Care Activities/Discussion with SLP re care plan: 5 minutes  Documentation Time: 10 minutes  Other Time Spent: 0 minutes  Total: 40 minutes     Scribe Attestation  By signing my name below, I Yari Evelina , Scribe attest that this documentation has been prepared under the direction and in the presence of Bert Chauhan MD.      This medical note was created with the assistance of artificial intelligence (AI) for documentation purposes. The content has been reviewed and confirmed by the healthcare provider for accuracy and completeness. Patient consented to the use of audio recording and use of AI during their visit.          [1] No family history on file.

## 2025-07-16 ENCOUNTER — OFFICE VISIT (OUTPATIENT)
Dept: PRIMARY CARE | Facility: CLINIC | Age: 60
End: 2025-07-16
Payer: COMMERCIAL

## 2025-07-16 VITALS
WEIGHT: 206 LBS | OXYGEN SATURATION: 97 % | RESPIRATION RATE: 16 BRPM | SYSTOLIC BLOOD PRESSURE: 126 MMHG | DIASTOLIC BLOOD PRESSURE: 84 MMHG | HEIGHT: 64 IN | HEART RATE: 92 BPM | TEMPERATURE: 97.7 F | BODY MASS INDEX: 35.17 KG/M2

## 2025-07-16 DIAGNOSIS — F25.9 SCHIZOAFFECTIVE DISORDER, UNSPECIFIED TYPE: ICD-10-CM

## 2025-07-16 DIAGNOSIS — G40.802 OTHER EPILEPSY WITHOUT STATUS EPILEPTICUS, NOT INTRACTABLE: ICD-10-CM

## 2025-07-16 DIAGNOSIS — K14.6 TONGUE SORE: Primary | ICD-10-CM

## 2025-07-16 DIAGNOSIS — E88.01 ALPHA-1-ANTITRYPSIN DEFICIENCY (MULTI): ICD-10-CM

## 2025-07-16 PROCEDURE — 3008F BODY MASS INDEX DOCD: CPT | Performed by: FAMILY MEDICINE

## 2025-07-16 PROCEDURE — G2211 COMPLEX E/M VISIT ADD ON: HCPCS | Performed by: FAMILY MEDICINE

## 2025-07-16 PROCEDURE — 99213 OFFICE O/P EST LOW 20 MIN: CPT | Performed by: FAMILY MEDICINE

## 2025-07-16 ASSESSMENT — ENCOUNTER SYMPTOMS
COUGH: 0
HEADACHES: 0
WHEEZING: 0
FEVER: 0
ABDOMINAL PAIN: 0
DIZZINESS: 0
VOMITING: 0
SHORTNESS OF BREATH: 0
NUMBNESS: 0
CONSTIPATION: 0
WEAKNESS: 0
SORE THROAT: 1
DIARRHEA: 0

## 2025-07-16 NOTE — PROGRESS NOTES
"Subjective   Patient ID: Karolina Vogt is a 60 y.o. female who presents for painful lump under tongue left side (Painfull to swallow).    Patient has continued discomfort she feels like she has some swelling in her tongue.  This been going on for weeks.  She saw ENT.  They looked they did not see anything.  Patient states she feels no discomfort when she swallows, she has not had any no change in her diet, no new medications.  Patient has no fever no chills, no discharge, she states it hurts when she swallows.  This is not worsening, however it has not improved either.  Patient is not seeing any sores,.  She has been using her oxygen more.  No neck swelling, no coughing, patient wanted to have it rechecked         Review of Systems   Constitutional:  Negative for fever.   HENT:  Positive for sore throat.    Respiratory:  Negative for cough, shortness of breath and wheezing.    Cardiovascular:  Negative for chest pain and leg swelling.   Gastrointestinal:  Negative for abdominal pain, constipation, diarrhea and vomiting.   Skin:  Negative for rash.   Neurological:  Negative for dizziness, weakness, numbness and headaches.       Objective   /84 (BP Location: Left arm, Patient Position: Sitting, BP Cuff Size: Adult)   Pulse 92   Temp 36.5 °C (97.7 °F)   Resp 16   Ht 1.626 m (5' 4\")   Wt 93.4 kg (206 lb)   LMP 01/01/2010   SpO2 97%   BMI 35.36 kg/m²     Physical Exam  Vitals reviewed.   Constitutional:       General: She is not in acute distress.     Appearance: Normal appearance. She is well-developed.   HENT:      Head: Normocephalic.      Right Ear: Tympanic membrane normal.      Left Ear: Tympanic membrane normal.      Mouth/Throat:      Mouth: Mucous membranes are moist.      Pharynx: No oropharyngeal exudate or posterior oropharyngeal erythema.      Comments: Patient is a.  It was like swelling on her tongue.  No no sores, no vesicles no ulcers, throat appears normal    Eyes:      Conjunctiva/sclera: " Conjunctivae normal.       Cardiovascular:      Rate and Rhythm: Normal rate and regular rhythm.      Heart sounds: Normal heart sounds.   Pulmonary:      Effort: Pulmonary effort is normal.      Breath sounds: Normal breath sounds.     Skin:     Findings: No rash.     Neurological:      Mental Status: She is alert.     Psychiatric:         Mood and Affect: Mood normal.         Behavior: Behavior normal.         Assessment/Plan   Problem List Items Addressed This Visit       RESOLVED: Schizoaffective disorder (Multi)    Patient sees psychiatry         Alpha-1-antitrypsin deficiency (Multi)    Sees pulmonology         RESOLVED: Other epilepsy without status epilepticus, not intractable    Stable          Other Visit Diagnoses         Tongue sore    -  Primary          Patient is seen ENT.  She may try over-the-counter medication, salt water gargles to see if this improves it.  If it continues she should follow back up with ENT.  Patient aware if any chest pain shortness of breath any fever chills increased symptoms any worsening symptoms any trouble swallowing notify the office or go to the ER  Follow-up in 2 weeks

## 2025-07-23 ENCOUNTER — HOSPITAL ENCOUNTER (OUTPATIENT)
Dept: RADIOLOGY | Facility: CLINIC | Age: 60
Discharge: HOME | End: 2025-07-23
Payer: COMMERCIAL

## 2025-07-23 DIAGNOSIS — F17.218 CIGARETTE NICOTINE DEPENDENCE WITH OTHER NICOTINE-INDUCED DISORDER: ICD-10-CM

## 2025-07-23 PROCEDURE — 71271 CT THORAX LUNG CANCER SCR C-: CPT | Performed by: RADIOLOGY

## 2025-07-23 PROCEDURE — 71271 CT THORAX LUNG CANCER SCR C-: CPT

## 2025-07-29 ENCOUNTER — APPOINTMENT (OUTPATIENT)
Dept: PRIMARY CARE | Facility: CLINIC | Age: 60
End: 2025-07-29
Payer: COMMERCIAL

## 2025-07-31 ENCOUNTER — TELEPHONE (OUTPATIENT)
Dept: PULMONOLOGY | Facility: CLINIC | Age: 60
End: 2025-07-31
Payer: COMMERCIAL

## 2025-08-05 ENCOUNTER — PATIENT OUTREACH (OUTPATIENT)
Dept: CARE COORDINATION | Facility: CLINIC | Age: 60
End: 2025-08-05
Payer: COMMERCIAL

## 2025-08-05 DIAGNOSIS — R91.1 LUNG NODULE: Primary | ICD-10-CM

## 2025-08-05 DIAGNOSIS — Z12.31 ENCOUNTER FOR SCREENING MAMMOGRAM FOR BREAST CANCER: ICD-10-CM

## 2025-08-07 ENCOUNTER — APPOINTMENT (OUTPATIENT)
Facility: CLINIC | Age: 60
End: 2025-08-07
Payer: COMMERCIAL

## 2025-08-20 ENCOUNTER — APPOINTMENT (OUTPATIENT)
Facility: CLINIC | Age: 60
End: 2025-08-20
Payer: COMMERCIAL

## 2025-08-22 ENCOUNTER — APPOINTMENT (OUTPATIENT)
Dept: RADIOLOGY | Facility: CLINIC | Age: 60
End: 2025-08-22
Payer: COMMERCIAL

## 2025-08-29 ENCOUNTER — TELEPHONE (OUTPATIENT)
Dept: RADIOLOGY | Facility: HOSPITAL | Age: 60
End: 2025-08-29
Payer: COMMERCIAL

## 2025-09-03 DIAGNOSIS — G43.109 MIGRAINE WITH VISUAL AURA: ICD-10-CM

## 2025-09-04 RX ORDER — UBROGEPANT 100 MG/1
TABLET ORAL
Qty: 16 TABLET | Refills: 6 | OUTPATIENT
Start: 2025-09-04

## 2025-11-03 ENCOUNTER — APPOINTMENT (OUTPATIENT)
Facility: CLINIC | Age: 60
End: 2025-11-03
Payer: COMMERCIAL